# Patient Record
Sex: FEMALE
[De-identification: names, ages, dates, MRNs, and addresses within clinical notes are randomized per-mention and may not be internally consistent; named-entity substitution may affect disease eponyms.]

---

## 2017-11-30 ENCOUNTER — RESULT REVIEW (OUTPATIENT)
Age: 57
End: 2017-11-30

## 2017-12-22 PROBLEM — Z00.00 ENCOUNTER FOR PREVENTIVE HEALTH EXAMINATION: Status: ACTIVE | Noted: 2017-12-22

## 2018-01-03 ENCOUNTER — APPOINTMENT (OUTPATIENT)
Dept: BREAST CENTER | Facility: CLINIC | Age: 58
End: 2018-01-03
Payer: COMMERCIAL

## 2018-01-03 VITALS
HEIGHT: 64 IN | BODY MASS INDEX: 35.34 KG/M2 | DIASTOLIC BLOOD PRESSURE: 82 MMHG | SYSTOLIC BLOOD PRESSURE: 160 MMHG | WEIGHT: 207 LBS | HEART RATE: 90 BPM

## 2018-01-03 DIAGNOSIS — Z78.9 OTHER SPECIFIED HEALTH STATUS: ICD-10-CM

## 2018-01-03 DIAGNOSIS — Z82.49 FAMILY HISTORY OF ISCHEMIC HEART DISEASE AND OTHER DISEASES OF THE CIRCULATORY SYSTEM: ICD-10-CM

## 2018-01-03 DIAGNOSIS — F15.90 OTHER STIMULANT USE, UNSPECIFIED, UNCOMPLICATED: ICD-10-CM

## 2018-01-03 DIAGNOSIS — Z13.71 ENCOUNTER FOR NONPROCREATIVE SCREENING FOR GENETIC DISEASE CARRIER STATUS: ICD-10-CM

## 2018-01-03 DIAGNOSIS — Z80.3 FAMILY HISTORY OF MALIGNANT NEOPLASM OF BREAST: ICD-10-CM

## 2018-01-03 DIAGNOSIS — Z86.19 PERSONAL HISTORY OF OTHER INFECTIOUS AND PARASITIC DISEASES: ICD-10-CM

## 2018-01-03 DIAGNOSIS — Z87.39 PERSONAL HISTORY OF OTHER DISEASES OF THE MUSCULOSKELETAL SYSTEM AND CONNECTIVE TISSUE: ICD-10-CM

## 2018-01-03 DIAGNOSIS — Z85.850 PERSONAL HISTORY OF MALIGNANT NEOPLASM OF THYROID: ICD-10-CM

## 2018-01-03 PROCEDURE — 99242 OFF/OP CONSLTJ NEW/EST SF 20: CPT

## 2018-01-03 PROCEDURE — 36415 COLL VENOUS BLD VENIPUNCTURE: CPT

## 2018-01-08 ENCOUNTER — RX RENEWAL (OUTPATIENT)
Age: 58
End: 2018-01-08

## 2018-04-05 ENCOUNTER — RX RENEWAL (OUTPATIENT)
Age: 58
End: 2018-04-05

## 2018-05-09 ENCOUNTER — APPOINTMENT (OUTPATIENT)
Dept: CARDIOLOGY | Facility: CLINIC | Age: 58
End: 2018-05-09
Payer: COMMERCIAL

## 2018-05-09 ENCOUNTER — NON-APPOINTMENT (OUTPATIENT)
Age: 58
End: 2018-05-09

## 2018-05-09 VITALS — HEART RATE: 80 BPM

## 2018-05-09 VITALS
DIASTOLIC BLOOD PRESSURE: 78 MMHG | BODY MASS INDEX: 33.63 KG/M2 | SYSTOLIC BLOOD PRESSURE: 118 MMHG | HEIGHT: 64 IN | WEIGHT: 197 LBS | RESPIRATION RATE: 16 BRPM | HEART RATE: 80 BPM

## 2018-05-09 PROCEDURE — 99213 OFFICE O/P EST LOW 20 MIN: CPT

## 2018-05-09 PROCEDURE — 93000 ELECTROCARDIOGRAM COMPLETE: CPT

## 2018-05-30 ENCOUNTER — RX RENEWAL (OUTPATIENT)
Age: 58
End: 2018-05-30

## 2018-07-02 ENCOUNTER — RX RENEWAL (OUTPATIENT)
Age: 58
End: 2018-07-02

## 2018-10-16 ENCOUNTER — RX RENEWAL (OUTPATIENT)
Age: 58
End: 2018-10-16

## 2018-11-14 ENCOUNTER — APPOINTMENT (OUTPATIENT)
Dept: CARDIOLOGY | Facility: CLINIC | Age: 58
End: 2018-11-14
Payer: COMMERCIAL

## 2018-11-14 ENCOUNTER — NON-APPOINTMENT (OUTPATIENT)
Age: 58
End: 2018-11-14

## 2018-11-14 VITALS
RESPIRATION RATE: 16 BRPM | BODY MASS INDEX: 33.29 KG/M2 | OXYGEN SATURATION: 97 % | HEIGHT: 64 IN | SYSTOLIC BLOOD PRESSURE: 124 MMHG | DIASTOLIC BLOOD PRESSURE: 78 MMHG | WEIGHT: 195 LBS | HEART RATE: 86 BPM

## 2018-11-14 PROCEDURE — 93000 ELECTROCARDIOGRAM COMPLETE: CPT

## 2018-11-14 PROCEDURE — 99213 OFFICE O/P EST LOW 20 MIN: CPT

## 2018-11-14 NOTE — REASON FOR VISIT
[Follow-Up - Clinic] : a clinic follow-up of [Hyperlipidemia] : hyperlipidemia [Hypertension] : hypertension [Mitral Regurgitation] : mitral regurgitation [FreeTextEntry1] : This is a 58-year-old female with past medical history significant for non-insulin-dependent diabetes mellitus, mitral valve regurgitation, hypothyroidism, occasional palpitations, dyspepsia, who comes in for cardiac evaluation.

## 2018-11-14 NOTE — PHYSICAL EXAM
[General Appearance - Well Developed] : well developed [Normal Appearance] : normal appearance [Well Groomed] : well groomed [General Appearance - Well Nourished] : well nourished [No Deformities] : no deformities [General Appearance - In No Acute Distress] : no acute distress [Normal Conjunctiva] : the conjunctiva exhibited no abnormalities [Normal Oral Mucosa] : normal oral mucosa [Normal Oropharynx] : normal oropharynx [Normal Jugular Venous A Waves Present] : normal jugular venous A waves present [Normal Jugular Venous V Waves Present] : normal jugular venous V waves present [No Jugular Venous Reece A Waves] : no jugular venous reece A waves [Bowel Sounds] : normal bowel sounds [Abdomen Soft] : soft [Abnormal Walk] : normal gait [Gait - Sufficient For Exercise Testing] : the gait was sufficient for exercise testing [Nail Clubbing] : no clubbing of the fingernails [Cyanosis, Localized] : no localized cyanosis [Skin Color & Pigmentation] : normal skin color and pigmentation [Oriented To Time, Place, And Person] : oriented to person, place, and time [Affect] : the affect was normal [Mood] : the mood was normal [No Anxiety] : not feeling anxious [] : no respiratory distress [Respiration, Rhythm And Depth] : normal respiratory rhythm and effort [Exaggerated Use Of Accessory Muscles For Inspiration] : no accessory muscle use [Auscultation Breath Sounds / Voice Sounds] : lungs were clear to auscultation bilaterally [5th Left ICS - MCL] : palpated at the 5th LICS in the midclavicular line [Normal] : normal [No Precordial Heave] : no precordial heave was noted [Normal Rate] : normal [Rhythm Regular] : regular [Normal S1] : normal S1 [Normal S2] : normal S2 [No Gallop] : no gallop heard [I] : a grade 1 [2+] : left 2+ [No Abnormalities] : the abdominal aorta was not enlarged and no bruit was heard [No Pitting Edema] : no pitting edema present [S3] : no S3 [S4] : no S4 [Click] : no click [Pericardial Rub] : no pericardial rub [Right Carotid Bruit] : no bruit heard over the right carotid [Left Carotid Bruit] : no bruit heard over the left carotid [Right Femoral Bruit] : no bruit heard over the right femoral artery [Left Femoral Bruit] : no bruit heard over the left femoral artery

## 2018-11-14 NOTE — DISCUSSION/SUMMARY
[FreeTextEntry1] : This is a 58-year-old female with past medical history significant for non-insulin-dependent diabetes mellitus, mitral valve regurgitation, hypothyroidism, occasional palpitations, dyspepsia, who comes in for cardiac evaluation. She denies chest pain, shortness of breath, dizziness or syncope.\par The patient is doing well from a cardiac standpoint. He tried to maintain adequate weight loss.\par Electrocardiogram done November 14, 2018 demonstrated normal sinus rhythm rate of 86 beats per minute is otherwise unremarkable.\par The patient will followup with her endocrinologist. She just returned from a  [place]. She is on pravastatin 20 mg daily, Synthroid 150 mcg per day, aspirin 81 mg per day, telmisartan 80 mg per day, in addition to her diabetic medication.( glipizide 2.5mg, Invokana 300mg, glucophage 500mg, Tradjenta 5mg)\par She will have  blood work prior to next visit to see me.\par The patient understands that aerobic exercises must be increased to 40 minutes 4 times per week. A detailed discussion of lifestyle modification was done today. The patient has a good understanding of the diagnosis, and treatment plan. Lifestyle modification was also outlined.

## 2018-12-20 ENCOUNTER — APPOINTMENT (OUTPATIENT)
Dept: OBGYN | Facility: CLINIC | Age: 58
End: 2018-12-20
Payer: COMMERCIAL

## 2018-12-20 VITALS
WEIGHT: 200 LBS | DIASTOLIC BLOOD PRESSURE: 78 MMHG | BODY MASS INDEX: 33.73 KG/M2 | SYSTOLIC BLOOD PRESSURE: 120 MMHG | HEIGHT: 64.5 IN

## 2018-12-20 LAB
BILIRUB UR QL STRIP: NORMAL
GLUCOSE BLDC GLUCOMTR-MCNC: 173
GLUCOSE UR-MCNC: NORMAL
HEMOCCULT 2: NEGATIVE
HEMOCCULT SP1 STL QL: NEGATIVE
HGB UR QL STRIP.AUTO: NORMAL
KETONES UR-MCNC: NORMAL
LEUKOCYTE ESTERASE UR QL STRIP: NORMAL
NITRITE UR QL STRIP: NORMAL
PROT UR STRIP-MCNC: NORMAL
QUALITY CONTROL: YES

## 2018-12-20 PROCEDURE — 82962 GLUCOSE BLOOD TEST: CPT

## 2018-12-20 PROCEDURE — 90686 IIV4 VACC NO PRSV 0.5 ML IM: CPT

## 2018-12-20 PROCEDURE — 81003 URINALYSIS AUTO W/O SCOPE: CPT | Mod: QW

## 2018-12-20 PROCEDURE — 90471 IMMUNIZATION ADMIN: CPT

## 2018-12-20 PROCEDURE — 99386 PREV VISIT NEW AGE 40-64: CPT | Mod: 25

## 2018-12-20 PROCEDURE — 82270 OCCULT BLOOD FECES: CPT

## 2018-12-21 LAB — HPV HIGH+LOW RISK DNA PNL CVX: NOT DETECTED

## 2018-12-26 ENCOUNTER — FORM ENCOUNTER (OUTPATIENT)
Age: 58
End: 2018-12-26

## 2018-12-26 LAB — CYTOLOGY CVX/VAG DOC THIN PREP: NORMAL

## 2018-12-27 ENCOUNTER — OUTPATIENT (OUTPATIENT)
Dept: OUTPATIENT SERVICES | Facility: HOSPITAL | Age: 58
LOS: 1 days | End: 2018-12-27
Payer: COMMERCIAL

## 2018-12-27 ENCOUNTER — APPOINTMENT (OUTPATIENT)
Dept: ULTRASOUND IMAGING | Facility: CLINIC | Age: 58
End: 2018-12-27
Payer: COMMERCIAL

## 2018-12-27 DIAGNOSIS — Z00.8 ENCOUNTER FOR OTHER GENERAL EXAMINATION: ICD-10-CM

## 2018-12-27 DIAGNOSIS — Z80.41 FAMILY HISTORY OF MALIGNANT NEOPLASM OF OVARY: ICD-10-CM

## 2018-12-27 PROCEDURE — 76856 US EXAM PELVIC COMPLETE: CPT

## 2018-12-27 PROCEDURE — 76830 TRANSVAGINAL US NON-OB: CPT

## 2018-12-27 PROCEDURE — 76856 US EXAM PELVIC COMPLETE: CPT | Mod: 26

## 2018-12-27 PROCEDURE — 76830 TRANSVAGINAL US NON-OB: CPT | Mod: 26

## 2018-12-29 ENCOUNTER — OTHER (OUTPATIENT)
Age: 58
End: 2018-12-29

## 2018-12-31 ENCOUNTER — RX RENEWAL (OUTPATIENT)
Age: 58
End: 2018-12-31

## 2019-01-02 ENCOUNTER — OTHER (OUTPATIENT)
Age: 59
End: 2019-01-02

## 2019-01-10 ENCOUNTER — TRANSCRIPTION ENCOUNTER (OUTPATIENT)
Age: 59
End: 2019-01-10

## 2019-03-04 ENCOUNTER — TRANSCRIPTION ENCOUNTER (OUTPATIENT)
Age: 59
End: 2019-03-04

## 2019-06-05 ENCOUNTER — APPOINTMENT (OUTPATIENT)
Dept: GASTROENTEROLOGY | Facility: CLINIC | Age: 59
End: 2019-06-05
Payer: COMMERCIAL

## 2019-06-05 VITALS
HEART RATE: 86 BPM | DIASTOLIC BLOOD PRESSURE: 79 MMHG | WEIGHT: 198 LBS | BODY MASS INDEX: 33.39 KG/M2 | HEIGHT: 64.5 IN | SYSTOLIC BLOOD PRESSURE: 139 MMHG

## 2019-06-05 PROCEDURE — 99213 OFFICE O/P EST LOW 20 MIN: CPT

## 2019-06-05 NOTE — ASSESSMENT
[FreeTextEntry1] : 58 yo female with history of dyspepsia and bloating. Plan for abdomen and pelvis sonogram. Trial of PPI antispasmodics. Follow up in three weeks.

## 2019-06-05 NOTE — PHYSICAL EXAM
[General Appearance - Alert] : alert [General Appearance - In No Acute Distress] : in no acute distress [Auscultation Breath Sounds / Voice Sounds] : lungs were clear to auscultation bilaterally [Heart Rate And Rhythm] : heart rate was normal and rhythm regular [Heart Sounds] : normal S1 and S2 [Heart Sounds Gallop] : no gallops [Murmurs] : no murmurs [Heart Sounds Pericardial Friction Rub] : no pericardial rub [Bowel Sounds] : normal bowel sounds [Abdomen Soft] : soft [] : no hepato-splenomegaly [Abdomen Tenderness] : non-tender [Abdomen Mass (___ Cm)] : no abdominal mass palpated

## 2019-06-05 NOTE — HISTORY OF PRESENT ILLNESS
[de-identified] : 60 yo female with long history of irritable bowel syndrome. Patient has noted some increased bloating and dyspepsia. There's been no change in her medical history or change in medications. Patient has been taking daily PPIs.

## 2019-06-07 ENCOUNTER — APPOINTMENT (OUTPATIENT)
Dept: CARDIOLOGY | Facility: CLINIC | Age: 59
End: 2019-06-07
Payer: COMMERCIAL

## 2019-06-07 VITALS — HEART RATE: 96 BPM | DIASTOLIC BLOOD PRESSURE: 68 MMHG | SYSTOLIC BLOOD PRESSURE: 114 MMHG | RESPIRATION RATE: 16 BRPM

## 2019-06-07 VITALS
RESPIRATION RATE: 15 BRPM | DIASTOLIC BLOOD PRESSURE: 79 MMHG | HEIGHT: 64 IN | HEART RATE: 82 BPM | WEIGHT: 198 LBS | SYSTOLIC BLOOD PRESSURE: 126 MMHG | BODY MASS INDEX: 33.8 KG/M2

## 2019-06-07 PROCEDURE — ZZZZZ: CPT

## 2019-06-07 PROCEDURE — 93306 TTE W/DOPPLER COMPLETE: CPT

## 2019-06-07 PROCEDURE — 99213 OFFICE O/P EST LOW 20 MIN: CPT | Mod: 25

## 2019-06-07 PROCEDURE — 93015 CV STRESS TEST SUPVJ I&R: CPT

## 2019-06-14 RX ORDER — EMPAGLIFLOZIN 25 MG/1
25 TABLET, FILM COATED ORAL DAILY
Refills: 0 | Status: ACTIVE | COMMUNITY
Start: 2019-06-14

## 2019-06-14 RX ORDER — CANAGLIFLOZIN 300 MG/1
300 TABLET, FILM COATED ORAL
Refills: 0 | Status: DISCONTINUED | COMMUNITY
End: 2019-06-14

## 2019-06-27 ENCOUNTER — APPOINTMENT (OUTPATIENT)
Dept: GASTROENTEROLOGY | Facility: CLINIC | Age: 59
End: 2019-06-27
Payer: COMMERCIAL

## 2019-06-27 VITALS
DIASTOLIC BLOOD PRESSURE: 75 MMHG | SYSTOLIC BLOOD PRESSURE: 127 MMHG | WEIGHT: 200 LBS | HEIGHT: 64 IN | BODY MASS INDEX: 34.15 KG/M2 | HEART RATE: 85 BPM

## 2019-06-27 PROCEDURE — 99213 OFFICE O/P EST LOW 20 MIN: CPT

## 2019-06-27 NOTE — HISTORY OF PRESENT ILLNESS
[de-identified] : 60 yo female with history of GERD symptoms and IBS. Patient has had good response to antispasmodic medications. Abdominal sonogram was essentially normal. Patient has noted breakthrough GERD symptoms with pantoprazole.

## 2019-06-27 NOTE — ASSESSMENT
[FreeTextEntry1] : 58 yo female with GERD and IBS. Continue antispasmotic. Follow up in three to four months.

## 2019-09-12 ENCOUNTER — APPOINTMENT (OUTPATIENT)
Dept: SPINE | Facility: CLINIC | Age: 59
End: 2019-09-12

## 2019-10-02 ENCOUNTER — RX RENEWAL (OUTPATIENT)
Age: 59
End: 2019-10-02

## 2019-10-03 ENCOUNTER — APPOINTMENT (OUTPATIENT)
Dept: GASTROENTEROLOGY | Facility: CLINIC | Age: 59
End: 2019-10-03
Payer: COMMERCIAL

## 2019-10-03 VITALS
HEIGHT: 64 IN | SYSTOLIC BLOOD PRESSURE: 139 MMHG | DIASTOLIC BLOOD PRESSURE: 80 MMHG | BODY MASS INDEX: 33.8 KG/M2 | HEART RATE: 87 BPM | WEIGHT: 198 LBS

## 2019-10-03 DIAGNOSIS — K21.9 GASTRO-ESOPHAGEAL REFLUX DISEASE W/OUT ESOPHAGITIS: ICD-10-CM

## 2019-10-03 DIAGNOSIS — K58.9 IRRITABLE BOWEL SYNDROME W/OUT DIARRHEA: ICD-10-CM

## 2019-10-03 PROCEDURE — 99213 OFFICE O/P EST LOW 20 MIN: CPT

## 2019-10-03 NOTE — ASSESSMENT
[FreeTextEntry1] : 58 yo female with well controlled GERD and IBS. Continue current rx. Follow up in six months and consider taper at that time.

## 2019-10-03 NOTE — HISTORY OF PRESENT ILLNESS
[de-identified] : Ms. PORSCHE VILLALOBOS is a 59 year old female with history of GERD symptoms and IBS. Since changing antispasmodics, patient has had good symptomatic response. GERD is under good control with daily PPIs. There is no dysphagia. Patient did have an episode of sciatica but otherwise feels well.\par

## 2019-10-03 NOTE — PHYSICAL EXAM
[General Appearance - Alert] : alert [General Appearance - In No Acute Distress] : in no acute distress [Auscultation Breath Sounds / Voice Sounds] : lungs were clear to auscultation bilaterally [Heart Sounds] : normal S1 and S2 [Heart Rate And Rhythm] : heart rate was normal and rhythm regular [Heart Sounds Gallop] : no gallops [Murmurs] : no murmurs [Bowel Sounds] : normal bowel sounds [Heart Sounds Pericardial Friction Rub] : no pericardial rub [Abdomen Tenderness] : non-tender [] : no hepato-splenomegaly [Abdomen Soft] : soft [Abdomen Mass (___ Cm)] : no abdominal mass palpated

## 2019-10-11 ENCOUNTER — RX RENEWAL (OUTPATIENT)
Age: 59
End: 2019-10-11

## 2019-11-07 ENCOUNTER — APPOINTMENT (OUTPATIENT)
Dept: OBGYN | Facility: CLINIC | Age: 59
End: 2019-11-07
Payer: COMMERCIAL

## 2019-11-07 ENCOUNTER — RESULT CHARGE (OUTPATIENT)
Age: 59
End: 2019-11-07

## 2019-11-07 VITALS
WEIGHT: 205 LBS | SYSTOLIC BLOOD PRESSURE: 116 MMHG | DIASTOLIC BLOOD PRESSURE: 72 MMHG | HEIGHT: 64 IN | BODY MASS INDEX: 35 KG/M2

## 2019-11-07 DIAGNOSIS — N93.9 ABNORMAL UTERINE AND VAGINAL BLEEDING, UNSPECIFIED: ICD-10-CM

## 2019-11-07 DIAGNOSIS — B37.2 CANDIDIASIS OF SKIN AND NAIL: ICD-10-CM

## 2019-11-07 LAB
BILIRUB UR QL STRIP: NORMAL
GLUCOSE BLDC GLUCOMTR-MCNC: 150
GLUCOSE UR-MCNC: 500
HCG UR QL: 0.2 EU/DL
HGB UR QL STRIP.AUTO: NORMAL
KETONES UR-MCNC: NORMAL
LEUKOCYTE ESTERASE UR QL STRIP: NORMAL
NITRITE UR QL STRIP: NORMAL
PH UR STRIP: 5
PROT UR STRIP-MCNC: NORMAL
SP GR UR STRIP: 1.01

## 2019-11-07 PROCEDURE — 82962 GLUCOSE BLOOD TEST: CPT | Mod: QW

## 2019-11-07 PROCEDURE — 36415 COLL VENOUS BLD VENIPUNCTURE: CPT

## 2019-11-07 PROCEDURE — 81003 URINALYSIS AUTO W/O SCOPE: CPT | Mod: QW

## 2019-11-07 PROCEDURE — 99214 OFFICE O/P EST MOD 30 MIN: CPT

## 2019-11-07 NOTE — PHYSICAL EXAM
[Vulvitis] : vulvitis [Labia Majora Erythema] : erythema of the labia majora [Erythema] : erythema [Labia Minora Erythema] : erythema of the labia minora [No Bleeding] : there was no active vaginal bleeding [Discharge] : no discharge

## 2019-11-07 NOTE — HISTORY OF PRESENT ILLNESS
[Last Mammogram ___] : Last Mammogram was [unfilled] [1 Year Ago] : 1 year ago [Last Pap ___] : Last cervical pap smear was [unfilled] [Postmenopausal] : is postmenopausal [unknown] : the patient is unsure of the date of her LMP [Menarche Age: ____] : age at menarche was [unfilled] [Sexually Active] : is sexually active [Monogamous] : is monogamous [Male ___] : [unfilled] male [NA] : N/A [de-identified] : PELVIC US 12/27/2018 [Contraception] : does not use contraception

## 2019-11-07 NOTE — COUNSELING
[Exercise] : exercise [Nutrition] : nutrition [Other ___] : [unfilled] [Weight Management] : weight management

## 2019-11-14 ENCOUNTER — ASOB RESULT (OUTPATIENT)
Age: 59
End: 2019-11-14

## 2019-11-14 ENCOUNTER — APPOINTMENT (OUTPATIENT)
Dept: OBGYN | Facility: CLINIC | Age: 59
End: 2019-11-14
Payer: COMMERCIAL

## 2019-11-14 VITALS
DIASTOLIC BLOOD PRESSURE: 70 MMHG | HEIGHT: 64 IN | WEIGHT: 205 LBS | SYSTOLIC BLOOD PRESSURE: 130 MMHG | BODY MASS INDEX: 35 KG/M2

## 2019-11-14 PROCEDURE — 76830 TRANSVAGINAL US NON-OB: CPT

## 2019-11-14 PROCEDURE — XXXXX: CPT

## 2019-11-14 NOTE — PHYSICAL EXAM
[Alert] : alert [Awake] : awake [Acute Distress] : no acute distress [Oriented x3] : oriented to person, place, and time [Depressed Mood] : not depressed [Flat Affect] : affect not flat [No Lesions] : no genitalia lesions [Labia Majora Erythema] : no erythema of the labia majora [Labia Minora Erythema] : no erythema of the labia minora [Normal] : clitoris [Labia Majora] : labia major [Labia Minora] : labia minora [Atrophy] : atrophy [Dry Mucosa] : dry mucosa [No Bleeding] : there was no active vaginal bleeding [Tenderness] : nontender [Enlarged ___ wks] : not enlarged [Mass ___ cm] : no uterine mass was palpated

## 2019-11-14 NOTE — DISCUSSION/SUMMARY
[FreeTextEntry1] : I, Rafal Melchor, acted solely as a scribe for Dr. Walters on this date 11/14/2019.\par All medical record entries made by the Scribe were at my, Dr. Walters's direction and personally dictated by me on  11/14/2019. I have reviewed the chart and agree that the record accurately reflects my personal performance of the history, physical exam, assessment and plan. I have also personally directed, reviewed, and agreed with the chart.\par \par

## 2019-12-06 ENCOUNTER — APPOINTMENT (OUTPATIENT)
Dept: OBGYN | Facility: CLINIC | Age: 59
End: 2019-12-06
Payer: COMMERCIAL

## 2019-12-06 VITALS
SYSTOLIC BLOOD PRESSURE: 120 MMHG | HEIGHT: 64 IN | DIASTOLIC BLOOD PRESSURE: 80 MMHG | WEIGHT: 195 LBS | BODY MASS INDEX: 33.29 KG/M2

## 2019-12-06 PROCEDURE — XXXXX: CPT

## 2019-12-20 ENCOUNTER — APPOINTMENT (OUTPATIENT)
Dept: OBGYN | Facility: CLINIC | Age: 59
End: 2019-12-20

## 2020-01-15 ENCOUNTER — APPOINTMENT (OUTPATIENT)
Dept: CARDIOLOGY | Facility: CLINIC | Age: 60
End: 2020-01-15
Payer: COMMERCIAL

## 2020-01-15 ENCOUNTER — NON-APPOINTMENT (OUTPATIENT)
Age: 60
End: 2020-01-15

## 2020-01-15 VITALS
HEART RATE: 99 BPM | DIASTOLIC BLOOD PRESSURE: 74 MMHG | SYSTOLIC BLOOD PRESSURE: 122 MMHG | WEIGHT: 202 LBS | BODY MASS INDEX: 34.49 KG/M2 | RESPIRATION RATE: 16 BRPM | HEIGHT: 64 IN

## 2020-01-15 PROCEDURE — 93000 ELECTROCARDIOGRAM COMPLETE: CPT

## 2020-01-15 PROCEDURE — 99213 OFFICE O/P EST LOW 20 MIN: CPT

## 2020-01-16 ENCOUNTER — APPOINTMENT (OUTPATIENT)
Dept: OBGYN | Facility: CLINIC | Age: 60
End: 2020-01-16
Payer: COMMERCIAL

## 2020-01-16 VITALS
WEIGHT: 200 LBS | DIASTOLIC BLOOD PRESSURE: 74 MMHG | BODY MASS INDEX: 34.15 KG/M2 | HEIGHT: 64 IN | SYSTOLIC BLOOD PRESSURE: 120 MMHG | HEART RATE: 86 BPM

## 2020-01-16 DIAGNOSIS — R92.2 INCONCLUSIVE MAMMOGRAM: ICD-10-CM

## 2020-01-16 DIAGNOSIS — N76.2 ACUTE VULVITIS: ICD-10-CM

## 2020-01-16 LAB
BILIRUB UR QL STRIP: NORMAL
DATE COLLECTED: NORMAL
GLUCOSE BLDC GLUCOMTR-MCNC: 156
GLUCOSE UR-MCNC: ABNORMAL
HCG UR QL: 0.2 EU/DL
HEMOCCULT SP1 STL QL: NEGATIVE
HGB UR QL STRIP.AUTO: NORMAL
KETONES UR-MCNC: NORMAL
LEUKOCYTE ESTERASE UR QL STRIP: NORMAL
NITRITE UR QL STRIP: NORMAL
PH UR STRIP: 5.5
PROT UR STRIP-MCNC: NORMAL
QUALITY CONTROL: YES
SP GR UR STRIP: 1

## 2020-01-16 PROCEDURE — 99214 OFFICE O/P EST MOD 30 MIN: CPT | Mod: 25

## 2020-01-16 PROCEDURE — 82962 GLUCOSE BLOOD TEST: CPT

## 2020-01-16 PROCEDURE — 81003 URINALYSIS AUTO W/O SCOPE: CPT | Mod: QW

## 2020-01-16 PROCEDURE — 99396 PREV VISIT EST AGE 40-64: CPT

## 2020-01-16 PROCEDURE — 82270 OCCULT BLOOD FECES: CPT

## 2020-01-17 PROBLEM — R92.2 DENSE BREASTS: Status: ACTIVE | Noted: 2020-01-16

## 2020-01-17 PROBLEM — N76.2 VULVITIS: Status: ACTIVE | Noted: 2018-12-20

## 2020-01-17 LAB — HPV HIGH+LOW RISK DNA PNL CVX: NOT DETECTED

## 2020-01-17 RX ORDER — METFORMIN HYDROCHLORIDE 1000 MG/1
1000 TABLET, COATED ORAL DAILY
Refills: 0 | Status: ACTIVE | COMMUNITY

## 2020-01-17 NOTE — END OF VISIT
[FreeTextEntry3] : I, Sandra Sullivan, acted solely as a scribe for Dr. Stone on this date 01/16/2020. \par All medical record entries made by the Scribe were at my, Dr. Stone's  direction and personally dictated by me on 01/16/2020. I have reviewed the chart and agree that the record accurately reflects my personal performance of the history, physical exam, assessment and plan. I have also personally directed, reviewed, and agreed with the chart.

## 2020-01-17 NOTE — HISTORY OF PRESENT ILLNESS
[Last Pap ___] : Last cervical pap smear was [unfilled] [Last Mammogram ___] : Last Mammogram was [unfilled] [Postmenopausal] : is postmenopausal [Menarche Age: ____] : age at menarche was [unfilled] [unknown] : the patient is unsure of the date of her LMP [Currently In Menopause] : currently in menopause [Monogamous] : is monogamous [Sexually Active] : is sexually active [NA] : N/A [Male ___] : [unfilled] male [de-identified] : Pelvic us 11/14/2019, Breast MRI 11/18/2019 per pt [Contraception] : does not use contraception

## 2020-01-17 NOTE — PHYSICAL EXAM
[Awake] : awake [Alert] : alert [Examination Of The Breasts] : a normal appearance [No Masses] : no breast masses were palpable [Soft] : soft [Oriented x3] : oriented to person, place, and time [Labia Majora] : labia major [Labia Minora] : labia minora [Normal] : clitoris [Atrophy] : atrophy [No Bleeding] : there was no active vaginal bleeding [Pap Obtained] : a Pap smear was performed [Adnexa Absent] : was absent on the right [Uterine Adnexae] : was normal on the left [No Tenderness] : no rectal tenderness [Nl Sphincter Tone] : normal sphincter tone [Mass] : no breast mass [Acute Distress] : no acute distress [Nipple Discharge] : no nipple discharge [Axillary LAD] : no axillary lymphadenopathy [Tender] : non tender [Depressed Mood] : not depressed [Flat Affect] : affect not flat [Occult Blood] : occult blood test from digital rectal exam was negative [FreeTextEntry5] : Cervical stenotic

## 2020-01-22 ENCOUNTER — APPOINTMENT (OUTPATIENT)
Dept: CARDIOLOGY | Facility: CLINIC | Age: 60
End: 2020-01-22
Payer: COMMERCIAL

## 2020-01-22 LAB — CYTOLOGY CVX/VAG DOC THIN PREP: ABNORMAL

## 2020-01-22 PROCEDURE — 93224 XTRNL ECG REC UP TO 48 HRS: CPT

## 2020-01-23 ENCOUNTER — NON-APPOINTMENT (OUTPATIENT)
Age: 60
End: 2020-01-23

## 2020-01-27 ENCOUNTER — OUTPATIENT (OUTPATIENT)
Dept: OUTPATIENT SERVICES | Facility: HOSPITAL | Age: 60
LOS: 1 days | End: 2020-01-27
Payer: COMMERCIAL

## 2020-01-27 VITALS
RESPIRATION RATE: 20 BRPM | TEMPERATURE: 99 F | SYSTOLIC BLOOD PRESSURE: 136 MMHG | HEART RATE: 108 BPM | DIASTOLIC BLOOD PRESSURE: 78 MMHG | HEIGHT: 64 IN | WEIGHT: 198.2 LBS

## 2020-01-27 DIAGNOSIS — Z29.9 ENCOUNTER FOR PROPHYLACTIC MEASURES, UNSPECIFIED: ICD-10-CM

## 2020-01-27 DIAGNOSIS — Z01.818 ENCOUNTER FOR OTHER PREPROCEDURAL EXAMINATION: ICD-10-CM

## 2020-01-27 DIAGNOSIS — E11.9 TYPE 2 DIABETES MELLITUS WITHOUT COMPLICATIONS: ICD-10-CM

## 2020-01-27 DIAGNOSIS — I10 ESSENTIAL (PRIMARY) HYPERTENSION: ICD-10-CM

## 2020-01-27 DIAGNOSIS — N95.0 POSTMENOPAUSAL BLEEDING: ICD-10-CM

## 2020-01-27 DIAGNOSIS — E89.0 POSTPROCEDURAL HYPOTHYROIDISM: Chronic | ICD-10-CM

## 2020-01-27 DIAGNOSIS — Z90.721 ACQUIRED ABSENCE OF OVARIES, UNILATERAL: Chronic | ICD-10-CM

## 2020-01-27 PROCEDURE — G0463: CPT

## 2020-01-27 NOTE — H&P PST ADULT - NSICDXPASTMEDICALHX_GEN_ALL_CORE_FT
PAST MEDICAL HISTORY:  Diabetes mellitus     Hypertension     IBS (irritable bowel syndrome)     Thyroid cancer

## 2020-01-27 NOTE — H&P PST ADULT - NSICDXPROBLEM_GEN_ALL_CORE_FT
PROBLEM DIAGNOSES  Problem: Hypertension  Assessment and Plan: Preop medical eval.    Problem: Diabetes mellitus  Assessment and Plan: POC glucose DOS    Problem: Need for prophylactic measure  Assessment and Plan: Caprini score  4, mod risk for VTE, SCDs ordered, surgical team to assess need for pharm proph      Problem: Postmenopause bleeding  Assessment and Plan: Dilation and Curettage, hysteroscopy, possible polypecotmy

## 2020-01-27 NOTE — H&P PST ADULT - ASSESSMENT
58 yo female with post menopausal bleeding.    CAPRINI VTE 2.0 SCORE [CLOT updated 2019]    AGE RELATED RISK FACTORS                                                       MOBILITY RELATED FACTORS  [ ] Age 41-60 years                                            (1 Point)                    [ ] Bed rest                                                        (1 Point)  [ x ] Age: 61-74 years                                           (2 Points)                  [ ] Plaster cast                                                   (2 Points)  [ ] Age= 75 years                                              (3 Points)                    [ ] Bed bound for more than 72 hours                 (2 Points)    DISEASE RELATED RISK FACTORS                                               GENDER SPECIFIC FACTORS  [ ] Edema in the lower extremities                       (1 Point)              [ ] Pregnancy                                                     (1 Point)  [ ] Varicose veins                                               (1 Point)                     [ ] Post-partum < 6 weeks                                   (1 Point)             [ x ] BMI > 25 Kg/m2                                            (1 Point)                     [ ] Hormonal therapy  or oral contraception          (1 Point)                 [ ] Sepsis (in the previous month)                        (1 Point)               [ ] History of pregnancy complications                 (1 point)  [ ] Pneumonia or serious lung disease                                               [ ] Unexplained or recurrent                     (1 Point)           (in the previous month)                               (1 Point)  [ ] Abnormal pulmonary function test                     (1 Point)                 SURGERY RELATED RISK FACTORS  [ ] Acute myocardial infarction                              (1 Point)               [ ]  Section                                             (1 Point)  [ ] Congestive heart failure (in the previous month)  (1 Point)      [x ] Minor surgery                                                  (1 Point)   [ ] Inflammatory bowel disease                             (1 Point)               [ ] Arthroscopic surgery                                        (2 Points)  [ ] Central venous access                                      (2 Points)                [ ] General surgery lasting more than 45 minutes (2 points)  [ ] Malignancy- Present or previous                   (2 Points)                [ ] Elective arthroplasty                                         (5 points)    [ ] Stroke (in the previous month)                          (5 Points)                                                                                                                                                           HEMATOLOGY RELATED FACTORS                                                 TRAUMA RELATED RISK FACTORS  [ ] Prior episodes of VTE                                     (3 Points)                [ ] Fracture of the hip, pelvis, or leg                       (5 Points)  [ ] Positive family history for VTE                         (3 Points)             [ ] Acute spinal cord injury (in the previous month)  (5 Points)  [ ] Prothrombin 80123 A                                     (3 Points)               [ ] Paralysis  (less than 1 month)                             (5 Points)  [ ] Factor V Leiden                                             (3 Points)                  [ ] Multiple Trauma within 1 month                        (5 Points)  [ ] Lupus anticoagulants                                     (3 Points)                                                           [ ] Anticardiolipin antibodies                               (3 Points)                                                       [ ] High homocysteine in the blood                      (3 Points)                                             [ ] Other congenital or acquired thrombophilia      (3 Points)                                                [ ] Heparin induced thrombocytopenia                  (3 Points)                                     Total Score [      4    ]    OPIOID RISK TOOL    RODY EACH BOX THAT APPLIES AND ADD TOTALS AT THE END    FAMILY HISTORY OF SUBSTANCE ABUSE                 FEMALE         MALE                                                Alcohol                             [ x ]1 pt          [  ]3pts                                               Illegal Durgs                     [  x]2 pts        [  ]3pts                                               Rx Drugs                           [  ]4 pts        [  ]4 pts    PERSONAL HISTORY OF SUBSTANCE ABUSE                                                                                          Alcohol                             [  ]3 pts       [  ]3 pts                                               Illegal Drugs                     [  ]4 pts        [  ]4 pts                                               Rx Drugs                           [  ]5 pts        [  ]5 pts    AGE BETWEEN 16-45 YEARS                                      [  ]1 pt         [  ]1 pt    HISTORY OF PREADOLESCENT   SEXUAL ABUSE                                                             [ x ]3 pts        [  ]0pts    PSYCHOLOGICAL DISEASE                     ADD, OCD, Bipolar, Schizophrenia        [  ]2 pts         [  ]2 pts                      Depression                                               [  ]1 pt           [  ]1 pt           SCORING TOTAL   (add numbers and type here)              (*6*)                                     A score of 3 or lower indicated LOW risk for future opioid abuse  A score of 4 to 7 indicated moderate risk for future opioid abuse  A score of 8 or higher indicates a high risk for opioid abuse

## 2020-01-27 NOTE — H&P PST ADULT - NSICDXFAMILYHX_GEN_ALL_CORE_FT
FAMILY HISTORY:  Father  Still living? No  Family history of stroke, Age at diagnosis: 81-90    Mother  Still living? No  FH: ovarian cancer, Age at diagnosis: 61-70

## 2020-01-27 NOTE — H&P PST ADULT - NSANTHOSAYNRD_GEN_A_CORE
No. MARTA screening performed.  STOP BANG Legend: 0-2 = LOW Risk; 3-4 = INTERMEDIATE Risk; 5-8 = HIGH Risk

## 2020-01-30 ENCOUNTER — RX RENEWAL (OUTPATIENT)
Age: 60
End: 2020-01-30

## 2020-01-30 ENCOUNTER — NON-APPOINTMENT (OUTPATIENT)
Age: 60
End: 2020-01-30

## 2020-02-03 ENCOUNTER — RX RENEWAL (OUTPATIENT)
Age: 60
End: 2020-02-03

## 2020-02-09 ENCOUNTER — TRANSCRIPTION ENCOUNTER (OUTPATIENT)
Age: 60
End: 2020-02-09

## 2020-02-10 ENCOUNTER — APPOINTMENT (OUTPATIENT)
Dept: OBGYN | Facility: HOSPITAL | Age: 60
End: 2020-02-10
Payer: COMMERCIAL

## 2020-02-10 ENCOUNTER — RESULT REVIEW (OUTPATIENT)
Age: 60
End: 2020-02-10

## 2020-02-10 ENCOUNTER — OUTPATIENT (OUTPATIENT)
Dept: INPATIENT UNIT | Facility: HOSPITAL | Age: 60
LOS: 1 days | End: 2020-02-10
Payer: COMMERCIAL

## 2020-02-10 VITALS
HEART RATE: 78 BPM | DIASTOLIC BLOOD PRESSURE: 59 MMHG | OXYGEN SATURATION: 99 % | SYSTOLIC BLOOD PRESSURE: 116 MMHG | RESPIRATION RATE: 16 BRPM | TEMPERATURE: 98 F

## 2020-02-10 VITALS
RESPIRATION RATE: 16 BRPM | DIASTOLIC BLOOD PRESSURE: 45 MMHG | SYSTOLIC BLOOD PRESSURE: 101 MMHG | HEART RATE: 95 BPM | HEIGHT: 64 IN | WEIGHT: 198.2 LBS | OXYGEN SATURATION: 98 % | TEMPERATURE: 98 F

## 2020-02-10 DIAGNOSIS — E89.0 POSTPROCEDURAL HYPOTHYROIDISM: Chronic | ICD-10-CM

## 2020-02-10 DIAGNOSIS — Z90.721 ACQUIRED ABSENCE OF OVARIES, UNILATERAL: Chronic | ICD-10-CM

## 2020-02-10 DIAGNOSIS — N95.0 POSTMENOPAUSAL BLEEDING: ICD-10-CM

## 2020-02-10 LAB
GLUCOSE BLDC GLUCOMTR-MCNC: 115 MG/DL — HIGH (ref 70–99)
GLUCOSE BLDC GLUCOMTR-MCNC: 160 MG/DL — HIGH (ref 70–99)

## 2020-02-10 PROCEDURE — XXXXX: CPT

## 2020-02-10 PROCEDURE — 82962 GLUCOSE BLOOD TEST: CPT

## 2020-02-10 PROCEDURE — 88305 TISSUE EXAM BY PATHOLOGIST: CPT

## 2020-02-10 PROCEDURE — 88305 TISSUE EXAM BY PATHOLOGIST: CPT | Mod: 26

## 2020-02-10 PROCEDURE — 58558 HYSTEROSCOPY BIOPSY: CPT

## 2020-02-10 RX ORDER — ONDANSETRON 8 MG/1
4 TABLET, FILM COATED ORAL ONCE
Refills: 0 | Status: DISCONTINUED | OUTPATIENT
Start: 2020-02-10 | End: 2020-02-10

## 2020-02-10 RX ORDER — PANTOPRAZOLE SODIUM 20 MG/1
1 TABLET, DELAYED RELEASE ORAL
Qty: 0 | Refills: 0 | DISCHARGE

## 2020-02-10 RX ORDER — LEVOTHYROXINE SODIUM 125 MCG
1 TABLET ORAL
Qty: 0 | Refills: 0 | DISCHARGE

## 2020-02-10 RX ORDER — SODIUM CHLORIDE 9 MG/ML
1000 INJECTION, SOLUTION INTRAVENOUS
Refills: 0 | Status: DISCONTINUED | OUTPATIENT
Start: 2020-02-10 | End: 2020-03-04

## 2020-02-10 RX ORDER — TELMISARTAN 20 MG/1
1 TABLET ORAL
Qty: 0 | Refills: 0 | DISCHARGE

## 2020-02-10 RX ORDER — LINAGLIPTIN 5 MG/1
1 TABLET, FILM COATED ORAL
Qty: 0 | Refills: 0 | DISCHARGE

## 2020-02-10 RX ORDER — ASPIRIN/CALCIUM CARB/MAGNESIUM 324 MG
1 TABLET ORAL
Qty: 0 | Refills: 0 | DISCHARGE

## 2020-02-10 RX ORDER — HYDROMORPHONE HYDROCHLORIDE 2 MG/ML
0.5 INJECTION INTRAMUSCULAR; INTRAVENOUS; SUBCUTANEOUS
Refills: 0 | Status: DISCONTINUED | OUTPATIENT
Start: 2020-02-10 | End: 2020-02-10

## 2020-02-10 RX ORDER — EMPAGLIFLOZIN 10 MG/1
1 TABLET, FILM COATED ORAL
Qty: 0 | Refills: 0 | DISCHARGE

## 2020-02-10 RX ORDER — HYOSCYAMINE SULFATE 0.13 MG
1 TABLET ORAL
Qty: 0 | Refills: 0 | DISCHARGE

## 2020-02-10 RX ORDER — SODIUM CHLORIDE 9 MG/ML
1000 INJECTION, SOLUTION INTRAVENOUS
Refills: 0 | Status: DISCONTINUED | OUTPATIENT
Start: 2020-02-10 | End: 2020-02-10

## 2020-02-10 RX ORDER — METFORMIN HYDROCHLORIDE 850 MG/1
2 TABLET ORAL
Qty: 0 | Refills: 0 | DISCHARGE

## 2020-02-10 RX ADMIN — HYDROMORPHONE HYDROCHLORIDE 0.5 MILLIGRAM(S): 2 INJECTION INTRAMUSCULAR; INTRAVENOUS; SUBCUTANEOUS at 15:49

## 2020-02-10 NOTE — BRIEF OPERATIVE NOTE - OPERATION/FINDINGS
pinpoint cervical os, dilated after a cruciate incision, symmetrical cavity with a single dominant polypoid mass, normal ostiae and otherwise unremarkable cavities (endocervical and endometrial).

## 2020-02-10 NOTE — BRIEF OPERATIVE NOTE - NSICDXBRIEFPREOP_GEN_ALL_CORE_FT
PRE-OP DIAGNOSIS:  Postmenopausal state 10-Feb-2020 15:31:35  Pako Jung  Endometrial mass 10-Feb-2020 15:31:17  Pako Jung  Cervical os stenosis 10-Feb-2020 15:31:08  Pako Jung

## 2020-02-10 NOTE — BRIEF OPERATIVE NOTE - NSICDXBRIEFPOSTOP_GEN_ALL_CORE_FT
POST-OP DIAGNOSIS:  Cervical os stenosis 10-Feb-2020 15:32:45  Pako Jung  Endometrial polyp 10-Feb-2020 15:32:12  Pako Jung

## 2020-02-10 NOTE — ASU DISCHARGE PLAN (ADULT/PEDIATRIC) - CARE PROVIDER_API CALL
Pako Jung (MD)  Obstetrics and Gynecology  [address] [place] [address]  Phone: [phone]  Fax: [phone]  Follow Up Time:

## 2020-02-10 NOTE — BRIEF OPERATIVE NOTE - NSICDXBRIEFPROCEDURE_GEN_ALL_CORE_FT
PROCEDURES:  Hysteroscopy, with dilation and curettage of uterus and polypectomy or uterine myomectomy using MyoGreenstackre tissue removal system 10-Feb-2020 15:31:56  Pako Jung

## 2020-02-11 PROBLEM — C73 MALIGNANT NEOPLASM OF THYROID GLAND: Chronic | Status: ACTIVE | Noted: 2020-01-27

## 2020-02-11 PROBLEM — E11.9 TYPE 2 DIABETES MELLITUS WITHOUT COMPLICATIONS: Chronic | Status: ACTIVE | Noted: 2020-01-27

## 2020-02-11 PROBLEM — I10 ESSENTIAL (PRIMARY) HYPERTENSION: Chronic | Status: ACTIVE | Noted: 2020-01-27

## 2020-02-11 PROBLEM — K58.9 IRRITABLE BOWEL SYNDROME WITHOUT DIARRHEA: Chronic | Status: ACTIVE | Noted: 2020-01-27

## 2020-02-14 LAB — SURGICAL PATHOLOGY STUDY: SIGNIFICANT CHANGE UP

## 2020-02-19 ENCOUNTER — APPOINTMENT (OUTPATIENT)
Dept: OBGYN | Facility: CLINIC | Age: 60
End: 2020-02-19
Payer: COMMERCIAL

## 2020-02-19 VITALS
HEIGHT: 64 IN | DIASTOLIC BLOOD PRESSURE: 70 MMHG | BODY MASS INDEX: 34.15 KG/M2 | SYSTOLIC BLOOD PRESSURE: 124 MMHG | WEIGHT: 200 LBS

## 2020-02-19 DIAGNOSIS — Z86.39 PERSONAL HISTORY OF OTHER ENDOCRINE, NUTRITIONAL AND METABOLIC DISEASE: ICD-10-CM

## 2020-02-19 DIAGNOSIS — R14.0 ABDOMINAL DISTENSION (GASEOUS): ICD-10-CM

## 2020-02-19 PROCEDURE — 99213 OFFICE O/P EST LOW 20 MIN: CPT

## 2020-02-19 RX ORDER — MISOPROSTOL 200 UG/1
200 TABLET ORAL
Qty: 2 | Refills: 0 | Status: DISCONTINUED | COMMUNITY
Start: 2019-11-14 | End: 2020-02-19

## 2020-02-19 RX ORDER — CLOTRIMAZOLE AND BETAMETHASONE DIPROPIONATE 10; .5 MG/G; MG/G
1-0.05 CREAM TOPICAL TWICE DAILY
Qty: 1 | Refills: 1 | Status: DISCONTINUED | COMMUNITY
Start: 2019-11-07 | End: 2020-02-19

## 2020-02-20 NOTE — END OF VISIT
[FreeTextEntry3] : I, Rafal Melchor, acted solely as a scribe for Dr. Stone on this date 02/19/2020.\par All medical record entries made by the Scribe were at my, Dr. Stone's direction and personally dictated by me on  02/19/2020. I have reviewed the chart and agree that the record accurately reflects my personal performance of the history, physical exam, assessment and plan. I have also personally directed, reviewed, and agreed with the chart.\par \par

## 2020-02-20 NOTE — HISTORY OF PRESENT ILLNESS
[___ Month(s) Ago] : [unfilled] month(s) ago [Healthy Diet] : a healthy diet [Good] : being in good health [Regular Exercise] : regular exercise [Last Mammogram ___] : Last Mammogram was [unfilled] [Last Pap ___] : Last cervical pap smear was [unfilled] [Pregnancy History] : pregnancy history: [Total Preg ___] : [unfilled] [Living ___] : [unfilled] [Menarche Age: ____] : age at menarche was [unfilled] [AB Spont ___] : miscarriages: [unfilled] [Definite:  ___ (Date)] : the last menstrual period was [unfilled] [Sexually Active] : is sexually active [Monogamous] : is monogamous [Male ___] : [unfilled] male [Weight Concerns] : no concerns with her weight [de-identified] : breast mri 11/2019  abdomen u/s 06/09/2019

## 2020-03-26 NOTE — END OF VISIT
[FreeTextEntry3] : I, Sandra Sullivan, acted solely as a scribe for Dr. Walters on this date 12/06/2019. \par All medical record entries made by the Scribe were at my, Dr. Walters's  direction and personally dictated by me on 12/06/2019. I have reviewed the chart and agree that the record accurately reflects my personal performance of the history, physical exam, assessment and plan. I have also personally directed, reviewed, and agreed with the chart.

## 2020-03-26 NOTE — PROCEDURE
[Endometrial Biopsy] : Endometrial biopsy [Post-Menop. Bleeding] : post-menopausal bleeding [Risks] : risks [Benefits] : benefits [Alternatives] : alternatives [Patient] : patient [Infection] : infection [Bleeding] : bleeding [Allergic Reaction] : allergic reaction [Uterine Perforation] : uterine perforation [Pain] : pain [CONSENT OBTAINED] : written consent was obtained prior to the procedure. [Neg Pregnancy Test] : a pregnancy test was negative [Hibiclens] : Hibiclens [Paracervical Block] : A paracervical block was performed using [1%] : 1% [Without Epi] : without epinephrine [Tenaculum] : a single toothed tenaculum [Anteverted] : anteverted [Sent to Histology] : the specimen was place in buffered formalin and sent for pathlogy [Tolerated Poorly] : the patient tolerated the procedure poorly [de-identified] : Thickened endometrium

## 2020-04-03 ENCOUNTER — TRANSCRIPTION ENCOUNTER (OUTPATIENT)
Age: 60
End: 2020-04-03

## 2020-04-08 ENCOUNTER — TRANSCRIPTION ENCOUNTER (OUTPATIENT)
Age: 60
End: 2020-04-08

## 2020-04-16 ENCOUNTER — APPOINTMENT (OUTPATIENT)
Dept: GASTROENTEROLOGY | Facility: CLINIC | Age: 60
End: 2020-04-16

## 2020-04-28 ENCOUNTER — APPOINTMENT (OUTPATIENT)
Dept: GASTROENTEROLOGY | Facility: CLINIC | Age: 60
End: 2020-04-28
Payer: COMMERCIAL

## 2020-04-28 PROCEDURE — 99441: CPT

## 2020-05-05 ENCOUNTER — RX RENEWAL (OUTPATIENT)
Age: 60
End: 2020-05-05

## 2020-06-02 ENCOUNTER — RX RENEWAL (OUTPATIENT)
Age: 60
End: 2020-06-02

## 2020-07-07 ENCOUNTER — APPOINTMENT (OUTPATIENT)
Dept: OBGYN | Facility: CLINIC | Age: 60
End: 2020-07-07
Payer: COMMERCIAL

## 2020-07-07 ENCOUNTER — ASOB RESULT (OUTPATIENT)
Age: 60
End: 2020-07-07

## 2020-07-07 VITALS
DIASTOLIC BLOOD PRESSURE: 80 MMHG | HEIGHT: 64 IN | BODY MASS INDEX: 35.34 KG/M2 | SYSTOLIC BLOOD PRESSURE: 122 MMHG | WEIGHT: 207 LBS

## 2020-07-07 DIAGNOSIS — R93.89 ABNORMAL FINDINGS ON DIAGNOSTIC IMAGING OF OTHER SPECIFIED BODY STRUCTURES: ICD-10-CM

## 2020-07-07 DIAGNOSIS — Z12.39 ENCOUNTER FOR OTHER SCREENING FOR MALIGNANT NEOPLASM OF BREAST: ICD-10-CM

## 2020-07-07 PROCEDURE — 76856 US EXAM PELVIC COMPLETE: CPT | Mod: 59

## 2020-07-07 PROCEDURE — 76830 TRANSVAGINAL US NON-OB: CPT

## 2020-07-07 PROCEDURE — 99213 OFFICE O/P EST LOW 20 MIN: CPT | Mod: 25

## 2020-07-07 NOTE — HISTORY OF PRESENT ILLNESS
[Last Colonoscopy ___] : Last colonoscopy [unfilled] [Last Mammogram ___] : Last Mammogram was [unfilled] [Last Pap ___] : Last cervical pap smear was [unfilled] [Menarche ____ yo] : menarche at [unfilled] yo [Definite:  ___ (Date)] : the last menstrual period was [unfilled] [Menarche Age: ____] : age at menarche was [unfilled] [de-identified] : PELVIC US 07/07/2020 [Continuous] : no continuous [Dull] : no dull [Sharp] : no sharp [Stabbing] : no stabbing [Throbbing] : no throbbing [Burning] : no burning [Itching] : no itching [Mass] : no mass [Stinging] : no stinging [Lesion] : no lesion [Soreness] : no soreness [Discharge] : no discharge [Irregular Menses] : normal menses [Prolonged Menses] : menses of normal duration [Mass (___cm)] : no palpable mass [Localized Pain] : no localized pain [Nipple Discharge] : no nipple discharge [Diffused Pain] : no diffused pain [Hot Flashes] : no hot flashes [Skin Color Change] : no skin color change [Night Sweats] : no night sweats [Vaginal Itching] : no vaginal itching [Mood Changes] : no mood changes [Dyspareunia] : no dyspareunia

## 2020-07-07 NOTE — END OF VISIT
[FreeTextEntry3] : I, Rafal Melchor, acted solely as a scribe for Dr. Stone on this date 07/07/2020.\par All medical record entries made by the Scribe were at my, Dr. Stone's direction and personally dictated by me on  07/07/2020. I have reviewed the chart and agree that the record accurately reflects my personal performance of the history, physical exam, assessment and plan. I have also personally directed, reviewed, and agreed with the chart.\par \par

## 2020-08-05 ENCOUNTER — APPOINTMENT (OUTPATIENT)
Dept: CARDIOLOGY | Facility: CLINIC | Age: 60
End: 2020-08-05

## 2020-11-18 ENCOUNTER — APPOINTMENT (OUTPATIENT)
Dept: PULMONOLOGY | Facility: CLINIC | Age: 60
End: 2020-11-18

## 2020-11-30 ENCOUNTER — APPOINTMENT (OUTPATIENT)
Dept: PULMONOLOGY | Facility: CLINIC | Age: 60
End: 2020-11-30

## 2020-12-04 ENCOUNTER — APPOINTMENT (OUTPATIENT)
Dept: CARDIOLOGY | Facility: CLINIC | Age: 60
End: 2020-12-04
Payer: COMMERCIAL

## 2020-12-04 VITALS
HEIGHT: 64 IN | RESPIRATION RATE: 16 BRPM | SYSTOLIC BLOOD PRESSURE: 128 MMHG | HEART RATE: 91 BPM | WEIGHT: 205 LBS | DIASTOLIC BLOOD PRESSURE: 78 MMHG | BODY MASS INDEX: 35 KG/M2 | TEMPERATURE: 98 F

## 2020-12-04 PROCEDURE — 93000 ELECTROCARDIOGRAM COMPLETE: CPT

## 2020-12-04 PROCEDURE — 99072 ADDL SUPL MATRL&STAF TM PHE: CPT

## 2020-12-04 PROCEDURE — 99213 OFFICE O/P EST LOW 20 MIN: CPT

## 2020-12-07 RX ORDER — GLIPIZIDE 5 MG/1
5 TABLET, FILM COATED, EXTENDED RELEASE ORAL DAILY
Refills: 0 | Status: ACTIVE | COMMUNITY

## 2020-12-07 RX ORDER — CHROMIUM 200 MCG
25 MCG TABLET ORAL
Refills: 0 | Status: ACTIVE | COMMUNITY
Start: 2020-12-07

## 2020-12-07 RX ORDER — BIOTIN 10 MG
10000 TABLET ORAL DAILY
Refills: 0 | Status: ACTIVE | COMMUNITY
Start: 2020-12-07

## 2020-12-07 RX ORDER — LINAGLIPTIN 5 MG/1
5 TABLET, FILM COATED ORAL
Qty: 90 | Refills: 2 | Status: ACTIVE | COMMUNITY

## 2020-12-07 RX ORDER — LEVOTHYROXINE SODIUM 150 UG/1
150 TABLET ORAL
Qty: 90 | Refills: 2 | Status: ACTIVE | COMMUNITY

## 2021-02-02 ENCOUNTER — RX RENEWAL (OUTPATIENT)
Age: 61
End: 2021-02-02

## 2021-03-02 ENCOUNTER — APPOINTMENT (OUTPATIENT)
Dept: OBGYN | Facility: CLINIC | Age: 61
End: 2021-03-02
Payer: COMMERCIAL

## 2021-03-02 VITALS
TEMPERATURE: 97.4 F | BODY MASS INDEX: 35 KG/M2 | DIASTOLIC BLOOD PRESSURE: 70 MMHG | WEIGHT: 205 LBS | SYSTOLIC BLOOD PRESSURE: 130 MMHG | HEIGHT: 64 IN

## 2021-03-02 DIAGNOSIS — Z12.4 ENCOUNTER FOR SCREENING FOR MALIGNANT NEOPLASM OF CERVIX: ICD-10-CM

## 2021-03-02 DIAGNOSIS — Z01.419 ENCOUNTER FOR GYNECOLOGICAL EXAMINATION (GENERAL) (ROUTINE) W/OUT ABNORMAL FINDINGS: ICD-10-CM

## 2021-03-02 DIAGNOSIS — E66.01 MORBID (SEVERE) OBESITY DUE TO EXCESS CALORIES: ICD-10-CM

## 2021-03-02 DIAGNOSIS — Z87.42 PERSONAL HISTORY OF OTHER DISEASES OF THE FEMALE GENITAL TRACT: ICD-10-CM

## 2021-03-02 DIAGNOSIS — Z12.12 ENCOUNTER FOR SCREENING FOR MALIGNANT NEOPLASM OF RECTUM: ICD-10-CM

## 2021-03-02 DIAGNOSIS — Z78.0 ASYMPTOMATIC MENOPAUSAL STATE: ICD-10-CM

## 2021-03-02 PROCEDURE — 99396 PREV VISIT EST AGE 40-64: CPT

## 2021-03-02 PROCEDURE — 82270 OCCULT BLOOD FECES: CPT

## 2021-03-02 PROCEDURE — 81003 URINALYSIS AUTO W/O SCOPE: CPT | Mod: QW

## 2021-03-02 PROCEDURE — 99072 ADDL SUPL MATRL&STAF TM PHE: CPT

## 2021-03-09 ENCOUNTER — APPOINTMENT (OUTPATIENT)
Dept: CARDIOLOGY | Facility: CLINIC | Age: 61
End: 2021-03-09
Payer: COMMERCIAL

## 2021-03-09 VITALS
RESPIRATION RATE: 15 BRPM | HEIGHT: 64 IN | WEIGHT: 204 LBS | DIASTOLIC BLOOD PRESSURE: 80 MMHG | SYSTOLIC BLOOD PRESSURE: 132 MMHG | BODY MASS INDEX: 34.83 KG/M2 | HEART RATE: 84 BPM

## 2021-03-09 PROCEDURE — 99213 OFFICE O/P EST LOW 20 MIN: CPT | Mod: 25

## 2021-03-09 PROCEDURE — 99072 ADDL SUPL MATRL&STAF TM PHE: CPT

## 2021-03-09 PROCEDURE — 93015 CV STRESS TEST SUPVJ I&R: CPT

## 2021-03-09 PROCEDURE — 93306 TTE W/DOPPLER COMPLETE: CPT

## 2021-03-10 RX ORDER — BIOTIN 10 MG
10000 TABLET ORAL DAILY
Refills: 0 | Status: COMPLETED | COMMUNITY
Start: 2020-12-07 | End: 2021-03-10

## 2021-03-13 NOTE — DISCUSSION/SUMMARY
[FreeTextEntry1] : During this visit comprehensive counseling was given regarding the following concerns: \par \par 1 We discussed the need for proper nutrition and exercise. This includes cardiovascular and pelvic floor exercise. We discussed the role of high BMI and excessive adipose tissue as related to increased endogenous estrogen production. She was strongly encouraged to consider formal programs for sustained and progressive weight loss including Weight Watchers and even bariatric surgery. All questions were answered and support was given. \par \par 2 We discussed the importance of maintaining a proper vaccination schedule and we discussed the utility of the flu vaccine and TDaP. \par \par 3 We discussed the impact of chronic sun exposure and the risk for skin disease as a result. The benefits of a total body scan by a Dermatologist was reviewed.\par \par 4 We discussed the need for certain supplements for most people which include vitamin D3, calcium rich foods with limitation on calcium supplementation by tabular form to 600 mg by mouth daily. As part of a bone health program we recommend weight bearing exercises, and some limited sun exposure (10-15 minutes daily) to help convert vitamin D to its active form. Prescription for DEXA studies were given. \par \par 5 Given family history of ovarian cancer, we will order a pelvic sonogram for further evaluation.\par \par During this visit 20 minutes were spent face-to-face with greater than 50% of the time dedicated to counseling.

## 2021-03-13 NOTE — PHYSICAL EXAM
[Appropriately responsive] : appropriately responsive [Alert] : alert [No Acute Distress] : no acute distress [No Lymphadenopathy] : no lymphadenopathy [Soft] : soft [Non-tender] : non-tender [Non-distended] : non-distended [No HSM] : No HSM [No Lesions] : no lesions [No Mass] : no mass [Oriented x3] : oriented x3 [Examination Of The Breasts] : a normal appearance [No Discharge] : no discharge [No Masses] : no breast masses were palpable [Labia Majora] : normal [Labia Minora] : normal [Atrophy] : atrophy [Dry Mucosa] : dry mucosa [No Bleeding] : There was no active vaginal bleeding [Normal] : normal [Uterine Adnexae] : normal [No Tenderness] : no tenderness [Nl Sphincter Tone] : normal sphincter tone [FreeTextEntry9] : guaiac negative

## 2021-03-13 NOTE — HISTORY OF PRESENT ILLNESS
[Y] : Positive pregnancy history [Currently Active] : currently active [Men] : men [Mammogramdate] : 10/24/2018 [TextBox_19] : BR0 [PapSmeardate] : 01/16/2020 [TextBox_31] : NEG [LMPDate] : 2010 [PGxTotal] : 1 [[facility] : [facility] [PGHxABInduced] : 1 [FreeTextEntry1] : 2010

## 2021-03-13 NOTE — END OF VISIT
[FreeTextEntry3] : I, Ivonne Lovett, solely acted as scribe for Dr. Pako Jung on 03/02/2021.\par All medical entries made by the Scribe were at my, Dr. Jung's direction and personally dictated by me on 03/02/2021. I have reviewed the chart and agree that the record accurately reflects my personal performance of the history, physical exam, assessment and plan. I have also personally directed, reviewed, and agreed with the chart.

## 2021-03-15 ENCOUNTER — RESULT CHARGE (OUTPATIENT)
Age: 61
End: 2021-03-15

## 2021-03-15 ENCOUNTER — TRANSCRIPTION ENCOUNTER (OUTPATIENT)
Age: 61
End: 2021-03-15

## 2021-03-15 ENCOUNTER — APPOINTMENT (OUTPATIENT)
Dept: OBGYN | Facility: CLINIC | Age: 61
End: 2021-03-15
Payer: COMMERCIAL

## 2021-03-15 ENCOUNTER — ASOB RESULT (OUTPATIENT)
Age: 61
End: 2021-03-15

## 2021-03-15 DIAGNOSIS — R39.9 UNSPECIFIED SYMPTOMS AND SIGNS INVOLVING THE GENITOURINARY SYSTEM: ICD-10-CM

## 2021-03-15 PROCEDURE — 76830 TRANSVAGINAL US NON-OB: CPT

## 2021-03-15 PROCEDURE — 99072 ADDL SUPL MATRL&STAF TM PHE: CPT

## 2021-03-17 ENCOUNTER — APPOINTMENT (OUTPATIENT)
Dept: OBGYN | Facility: CLINIC | Age: 61
End: 2021-03-17
Payer: COMMERCIAL

## 2021-03-17 LAB
BILIRUB UR QL STRIP: NORMAL
GLUCOSE UR-MCNC: >1000
HCG UR QL: 0.2 EU/DL
HGB UR QL STRIP.AUTO: NORMAL
KETONES UR-MCNC: NORMAL
LEUKOCYTE ESTERASE UR QL STRIP: NORMAL
NITRITE UR QL STRIP: NORMAL
PH UR STRIP: 5
PROT UR STRIP-MCNC: NORMAL
SP GR UR STRIP: 1

## 2021-03-17 PROCEDURE — 81003 URINALYSIS AUTO W/O SCOPE: CPT | Mod: QW

## 2021-03-17 PROCEDURE — 99072 ADDL SUPL MATRL&STAF TM PHE: CPT

## 2021-04-06 ENCOUNTER — RX RENEWAL (OUTPATIENT)
Age: 61
End: 2021-04-06

## 2021-04-10 NOTE — PHYSICAL EXAM
[General Appearance - In No Acute Distress] : in no acute distress [General Appearance - Alert] : alert [Auscultation Breath Sounds / Voice Sounds] : lungs were clear to auscultation bilaterally [Heart Rate And Rhythm] : heart rate was normal and rhythm regular [Murmurs] : no murmurs [Heart Sounds] : normal S1 and S2 [Heart Sounds Gallop] : no gallops [Heart Sounds Pericardial Friction Rub] : no pericardial rub [Bowel Sounds] : normal bowel sounds [Abdomen Soft] : soft [Abdomen Mass (___ Cm)] : no abdominal mass palpated [] : no hepato-splenomegaly [Abdomen Tenderness] : non-tender Patient w/ pancreatic cancer per outpatient notes and patient was pending outpatient MRI abdomen to assess liver mets.   - will order inpatient given patient to have MRI thoracic/lumbar spine inpatient   - oncology notified of admission

## 2021-04-21 ENCOUNTER — APPOINTMENT (OUTPATIENT)
Dept: CT IMAGING | Facility: CLINIC | Age: 61
End: 2021-04-21
Payer: SELF-PAY

## 2021-04-21 ENCOUNTER — RESULT REVIEW (OUTPATIENT)
Age: 61
End: 2021-04-21

## 2021-04-21 ENCOUNTER — OUTPATIENT (OUTPATIENT)
Dept: OUTPATIENT SERVICES | Facility: HOSPITAL | Age: 61
LOS: 1 days | End: 2021-04-21
Payer: SELF-PAY

## 2021-04-21 DIAGNOSIS — Z90.721 ACQUIRED ABSENCE OF OVARIES, UNILATERAL: Chronic | ICD-10-CM

## 2021-04-21 DIAGNOSIS — E89.0 POSTPROCEDURAL HYPOTHYROIDISM: Chronic | ICD-10-CM

## 2021-04-21 DIAGNOSIS — Z00.8 ENCOUNTER FOR OTHER GENERAL EXAMINATION: ICD-10-CM

## 2021-04-21 PROCEDURE — 75571 CT HRT W/O DYE W/CA TEST: CPT

## 2021-04-21 PROCEDURE — 75571 CT HRT W/O DYE W/CA TEST: CPT | Mod: 26

## 2021-04-24 ENCOUNTER — APPOINTMENT (OUTPATIENT)
Dept: RADIOLOGY | Facility: CLINIC | Age: 61
End: 2021-04-24
Payer: COMMERCIAL

## 2021-04-24 ENCOUNTER — OUTPATIENT (OUTPATIENT)
Dept: OUTPATIENT SERVICES | Facility: HOSPITAL | Age: 61
LOS: 1 days | End: 2021-04-24
Payer: COMMERCIAL

## 2021-04-24 DIAGNOSIS — Z00.8 ENCOUNTER FOR OTHER GENERAL EXAMINATION: ICD-10-CM

## 2021-04-24 DIAGNOSIS — Z90.721 ACQUIRED ABSENCE OF OVARIES, UNILATERAL: Chronic | ICD-10-CM

## 2021-04-24 DIAGNOSIS — E89.0 POSTPROCEDURAL HYPOTHYROIDISM: Chronic | ICD-10-CM

## 2021-04-24 PROCEDURE — 77085 DXA BONE DENSITY AXL VRT FX: CPT | Mod: 26

## 2021-04-24 PROCEDURE — 77085 DXA BONE DENSITY AXL VRT FX: CPT

## 2021-04-30 ENCOUNTER — NON-APPOINTMENT (OUTPATIENT)
Age: 61
End: 2021-04-30

## 2021-05-26 ENCOUNTER — APPOINTMENT (OUTPATIENT)
Dept: CARDIOLOGY | Facility: CLINIC | Age: 61
End: 2021-05-26

## 2021-09-27 LAB
CYTOLOGY CVX/VAG DOC THIN PREP: ABNORMAL
HPV HIGH+LOW RISK DNA PNL CVX: NOT DETECTED

## 2021-11-15 NOTE — ASU PREOP CHECKLIST - ANTIBIOTIC
Patient c/o HA X few days. Was here yesterday for same complaints. No Hx of migraine. 20 G to R AC. Medicated per MD orders. Will continue to monitor.
n/a

## 2022-01-07 ENCOUNTER — APPOINTMENT (OUTPATIENT)
Dept: CARDIOLOGY | Facility: CLINIC | Age: 62
End: 2022-01-07

## 2022-02-14 ENCOUNTER — APPOINTMENT (OUTPATIENT)
Dept: CARDIOLOGY | Facility: CLINIC | Age: 62
End: 2022-02-14

## 2022-03-01 ENCOUNTER — RX RENEWAL (OUTPATIENT)
Age: 62
End: 2022-03-01

## 2022-04-06 ENCOUNTER — APPOINTMENT (OUTPATIENT)
Dept: CARDIOLOGY | Facility: CLINIC | Age: 62
End: 2022-04-06
Payer: COMMERCIAL

## 2022-04-06 ENCOUNTER — NON-APPOINTMENT (OUTPATIENT)
Age: 62
End: 2022-04-06

## 2022-04-06 VITALS
BODY MASS INDEX: 33.29 KG/M2 | TEMPERATURE: 97.5 F | HEART RATE: 77 BPM | OXYGEN SATURATION: 97 % | DIASTOLIC BLOOD PRESSURE: 84 MMHG | WEIGHT: 195 LBS | RESPIRATION RATE: 16 BRPM | HEIGHT: 64 IN | SYSTOLIC BLOOD PRESSURE: 125 MMHG

## 2022-04-06 PROCEDURE — 93000 ELECTROCARDIOGRAM COMPLETE: CPT

## 2022-04-06 PROCEDURE — 99214 OFFICE O/P EST MOD 30 MIN: CPT | Mod: 25

## 2022-04-06 NOTE — ASSESSMENT
[FreeTextEntry1] : This is a 62 year year old female here today for follow up cardiac followup evaluation. \par She has a past medical history significant for  non-insulin-dependent diabetes mellitus, mitral valve regurgitation, hypothyroidism, occasional palpitations, dyspepsia,.\par \par Today she is feeling generally well. She remains on ASA 81mg PO DAILY, Pravastatin 20mg PO DAILY and on Telmisartan 80mg PO DAILY tolerating well. \par She has been following up with her endocrinologist to help manage her DM.\par \par -Cardiac risk factors include HTN, HLD, DM.\par \par BLOOD PRESSURE:\par -BP is well controlled in today's visit.\par \par -I have discussed the importance of maintaining good BP control and reviewed the newest guidelines with the patient while re-enforcing dietary sodium restrictions to no more than 2-3 g daily, DASH diet, life style modifications as well as the goal of maintaining ideal body weight with the patient today. I have advised the patient to avoid the use of over-the-counter medications/ supplements especially NSAIDS.\par \par I have reviewed with Ms. VILLALOBOS that serious health consequences can occur when blood pressure is not well controlled and the need for strict compliance with medication and that optimal control can significantly reduce the risk of cardiovascular disease stroke, heart attack and other organ damage. They verbally expressed understanding of the fore mentioned serious health consequences to me today.\par \par BLOOD WORK:\par -Blood work was done by her PCP and a copy will be faxed to our office for our review. She states that her A1C has improved.\par \par CHOLESTEROL CONTROL:\par -Patient will continue the advised  TLC diet and to continue follow-up for treatment of hyperlipidemia and repeat blood testing with diet and exercise. I have discussed different exercises and the importance of maintenance of optimal body weight. The importance of staying within guidelines and recommendations was stressed to the patient today and they acknowledged that they understand this to me verbally.\par \par  -Ms. VILLALOBOS was educated and advised that failure to follow-up with my medical recommendations to lower cholesterol can result in severe health consequences therefore, they will continuing a low saturated and low fat diet and to avoid excessive carbohydrates to help reduce triglycerides and that lowering LDL levels is associated with a significant decrease in serious cardiac events including but not limited to heart attack stroke and overall death. We will continue lipid lowering agents as advised based on blood test results and the patient understands to call if they develop severe muscle discomfort or if they have a reddish tinted discoloration in their urine.\par \par DIABETIC CONTROL:\par I will advise the patient to keep try to stay on a low carbohydrate diet lose weight and exercise to reduce spikes in their blood sugar.  The importance of monitoring blood sugar regularly and HgBA1c level were reviewed. I have also discussed annual eye exams to prevent blindness,  monitoring urine microalbumin regularly to check for kidney damage and the importance of proper foot care and regularly checking feet to prevent sores and possibly loss of limbs was reviewed. \par \par TESTING/REPORTS:\par -EKG done Apr 06, 2022 which demonstrated regular sinus rhythm with nonspecific ST-T wave changes, BPM of 77.\par \par -She had a normal exercise stress test March 9, 2021\par \par -Echocardiogram done on 3/9/2021 mild mitral, aortic, tricuspid and pulmonic regurgitation with normal left ventricular systolic function. with normal left ventricular systolic function. EF of 68%\par \par -Electrocardiogram done January 15, 2020 demonstrated normal sinus rhythm rate 98 beats per minute and is otherwise unremarkable.\par \par -She had a normal 24 hour holter monitor on 1/23/2020 which revealed Sinus Rhythm with rare APC's with average HR 87.\par \par PLAN:\par -She will continue with her usual medications and will contact the office if she is having any complaints between now and their next follow up appointment.\par -The patient will schedule an Echo Doppler examination to evaluate murmur, left ventricular function, chamber size, and rule out hypertrophy.\par -She will follow up with her endocrinologist for management of her DM. \par \par I have discussed the plan of care with Ms. PORSCHE VILLALOBOS  and she  will follow up in 3 months. She is compliant with all of her medications.\par \par The patient understands that aerobic exercises must be increased to minutes 4 times/week and a detailed discussion of lifestyle modification was done today. \par The patient has a good understanding of the diagnosis, treatment plan and lifestyle modification. \par She will contact me at the office for any questions with their care or any changes in their health status.\par \par The plan of care was discussed with the patient with supervision physician Dr. Destin Simeon and will be carried out as noted above.\par \luciano Barry NP

## 2022-04-06 NOTE — PHYSICAL EXAM
[Well Developed] : well developed [Well Nourished] : well nourished [No Acute Distress] : no acute distress [Normal Venous Pressure] : normal venous pressure [No Carotid Bruit] : no carotid bruit [Normal S1, S2] : normal S1, S2 [No Murmur] : no murmur [No Rub] : no rub [Clear Lung Fields] : clear lung fields [Good Air Entry] : good air entry [No Respiratory Distress] : no respiratory distress  [Soft] : abdomen soft [Non Tender] : non-tender [No Masses/organomegaly] : no masses/organomegaly [Normal Bowel Sounds] : normal bowel sounds [Normal Gait] : normal gait [No Edema] : no edema [No Cyanosis] : no cyanosis [No Clubbing] : no clubbing [No Varicosities] : no varicosities [No Rash] : no rash [No Skin Lesions] : no skin lesions [Moves all extremities] : moves all extremities [No Focal Deficits] : no focal deficits [Normal Speech] : normal speech [Alert and Oriented] : alert and oriented [Normal memory] : normal memory [General Appearance - Well Developed] : well developed [Normal Appearance] : normal appearance [Well Groomed] : well groomed [General Appearance - Well Nourished] : well nourished [No Deformities] : no deformities [General Appearance - In No Acute Distress] : no acute distress [Normal Conjunctiva] : the conjunctiva exhibited no abnormalities [Normal Oral Mucosa] : normal oral mucosa [Normal Oropharynx] : normal oropharynx [Normal Jugular Venous A Waves Present] : normal jugular venous A waves present [Normal Jugular Venous V Waves Present] : normal jugular venous V waves present [No Jugular Venous Reece A Waves] : no jugular venous reece A waves [Bowel Sounds] : normal bowel sounds [Abdomen Soft] : soft [Abnormal Walk] : normal gait [Gait - Sufficient For Exercise Testing] : the gait was sufficient for exercise testing [Nail Clubbing] : no clubbing of the fingernails [Cyanosis, Localized] : no localized cyanosis [Petechial Hemorrhages (___cm)] : no petechial hemorrhages [Skin Color & Pigmentation] : normal skin color and pigmentation [Skin Turgor] : normal skin turgor [No Venous Stasis] : no venous stasis [Oriented To Time, Place, And Person] : oriented to person, place, and time [Affect] : the affect was normal [Mood] : the mood was normal [No Anxiety] : not feeling anxious [] : no respiratory distress [Respiration, Rhythm And Depth] : normal respiratory rhythm and effort [Exaggerated Use Of Accessory Muscles For Inspiration] : no accessory muscle use [Auscultation Breath Sounds / Voice Sounds] : lungs were clear to auscultation bilaterally [5th Left ICS - MCL] : palpated at the 5th LICS in the midclavicular line [Normal] : normal [No Precordial Heave] : no precordial heave was noted [Normal Rate] : normal [Rhythm Regular] : regular [Normal S1] : normal S1 [Normal S2] : normal S2 [No Gallop] : no gallop heard [I] : a grade 1 [2+] : left 2+ [No Abnormalities] : the abdominal aorta was not enlarged and no bruit was heard [No Pitting Edema] : no pitting edema present [S3] : no S3 [S4] : no S4 [Click] : no click [Pericardial Rub] : no pericardial rub [Right Carotid Bruit] : no bruit heard over the right carotid [Left Carotid Bruit] : no bruit heard over the left carotid [Right Femoral Bruit] : no bruit heard over the right femoral artery [Left Femoral Bruit] : no bruit heard over the left femoral artery

## 2022-04-06 NOTE — REASON FOR VISIT
[Follow-Up - Clinic] : a clinic follow-up of [Dyspnea] : dyspnea [Hyperlipidemia] : hyperlipidemia [Hypertension] : hypertension [Mitral Regurgitation] : mitral regurgitation [FreeTextEntry1] : NIDDM, elevated liver function tests, murmur, occasional GONZALEZ

## 2022-04-06 NOTE — DISCUSSION/SUMMARY
[FreeTextEntry1] : Dr. Simeon-(PRIOR VISIT and PMH WITH Dr. Simeon): \par This is a 61-year-old female with past medical history significant for non-insulin-dependent diabetes mellitus, mitral valve regurgitation, hypothyroidism, occasional palpitations, dyspepsia, who comes in for cardiac evaluation. She denies chest pain, shortness of breath, dizziness or syncope.\par \par The patient does get occasional dyspnea on exertion.\par \par She had a normal exercise stress test March 9, 2021.  The patient is still concerned about early coronary artery disease given her diabetic status and other cardiovascular risk profile.  She does get symptoms as noted above.  She will schedule a nuclear stress test to rule out significant myocardial ischemia.\par \par She will also have a coronary artery calcium score done.\par \par Her  was recently found to have 100% blockage of the left anterior descending artery and required multiple stents.\par \par She will have new blood work done today to check her LDL panel, SMA-20, and cholesterol panel.\par She will follow-up with me after above-noted diagnostic tests are completed.\par She is currently hemodynamically stable and asymptomatic from a cardiac standpoint.\par \par She had a normal exercise stress test March 9, 2021\par \par Electrocardiogram done January 15, 2020 demonstrated normal sinus rhythm rate 98 beats per minute and is otherwise unremarkable.\par \par The patient will followup with her endocrinologist.\par \par The patient understands that aerobic exercises must be increased to 40 minutes 4 times per week. A detailed discussion of lifestyle modification was done today. The patient has a good understanding of the diagnosis, and treatment plan. Lifestyle modification was also outlined.

## 2022-04-11 ENCOUNTER — NON-APPOINTMENT (OUTPATIENT)
Age: 62
End: 2022-04-11

## 2022-06-26 ENCOUNTER — NON-APPOINTMENT (OUTPATIENT)
Age: 62
End: 2022-06-26

## 2022-10-03 ENCOUNTER — LABORATORY RESULT (OUTPATIENT)
Age: 62
End: 2022-10-03

## 2022-10-03 ENCOUNTER — APPOINTMENT (OUTPATIENT)
Dept: CARDIOLOGY | Facility: CLINIC | Age: 62
End: 2022-10-03

## 2022-10-03 ENCOUNTER — NON-APPOINTMENT (OUTPATIENT)
Age: 62
End: 2022-10-03

## 2022-10-03 VITALS
HEIGHT: 64 IN | DIASTOLIC BLOOD PRESSURE: 74 MMHG | BODY MASS INDEX: 34.49 KG/M2 | SYSTOLIC BLOOD PRESSURE: 122 MMHG | HEART RATE: 86 BPM | RESPIRATION RATE: 16 BRPM | TEMPERATURE: 98 F | WEIGHT: 202 LBS | OXYGEN SATURATION: 97 %

## 2022-10-03 DIAGNOSIS — Z87.898 PERSONAL HISTORY OF OTHER SPECIFIED CONDITIONS: ICD-10-CM

## 2022-10-03 PROCEDURE — 99214 OFFICE O/P EST MOD 30 MIN: CPT

## 2022-10-03 PROCEDURE — 93306 TTE W/DOPPLER COMPLETE: CPT

## 2022-10-03 RX ORDER — HYOSCYAMINE SULFATE 0.38 MG/1
0.38 TABLET, EXTENDED RELEASE ORAL
Qty: 180 | Refills: 3 | Status: COMPLETED | COMMUNITY
Start: 2019-06-05 | End: 2022-10-03

## 2022-10-03 NOTE — ASSESSMENT
[FreeTextEntry1] : Prior note nurse practitioner Gill April 6, 2022::\par \par This is a 62 year year old female here today for follow up cardiac followup evaluation. \par She has a past medical history significant for  non-insulin-dependent diabetes mellitus, mitral valve regurgitation, hypothyroidism, occasional palpitations, dyspepsia,.\par \par Today she is feeling generally well. She remains on ASA 81mg PO DAILY, Pravastatin 20mg PO DAILY and on Telmisartan 80mg PO DAILY tolerating well. \par She has been following up with her endocrinologist to help manage her DM.\par \par -Cardiac risk factors include HTN, HLD, DM.\par \par BLOOD PRESSURE:\par -BP is well controlled in today's visit.\par \par -I have discussed the importance of maintaining good BP control and reviewed the newest guidelines with the patient while re-enforcing dietary sodium restrictions to no more than 2-3 g daily, DASH diet, life style modifications as well as the goal of maintaining ideal body weight with the patient today. I have advised the patient to avoid the use of over-the-counter medications/ supplements especially NSAIDS.\par \par I have reviewed with Ms. VILLALOBOS that serious health consequences can occur when blood pressure is not well controlled and the need for strict compliance with medication and that optimal control can significantly reduce the risk of cardiovascular disease stroke, heart attack and other organ damage. They verbally expressed understanding of the fore mentioned serious health consequences to me today.\par \par BLOOD WORK:\par -Blood work was done by her PCP and a copy will be faxed to our office for our review. She states that her A1C has improved.\par \par CHOLESTEROL CONTROL:\par -Patient will continue the advised  TLC diet and to continue follow-up for treatment of hyperlipidemia and repeat blood testing with diet and exercise. I have discussed different exercises and the importance of maintenance of optimal body weight. The importance of staying within guidelines and recommendations was stressed to the patient today and they acknowledged that they understand this to me verbally.\par \par  -Ms. VILLALOBOS was educated and advised that failure to follow-up with my medical recommendations to lower cholesterol can result in severe health consequences therefore, they will continuing a low saturated and low fat diet and to avoid excessive carbohydrates to help reduce triglycerides and that lowering LDL levels is associated with a significant decrease in serious cardiac events including but not limited to heart attack stroke and overall death. We will continue lipid lowering agents as advised based on blood test results and the patient understands to call if they develop severe muscle discomfort or if they have a reddish tinted discoloration in their urine.\par \par DIABETIC CONTROL:\par I will advise the patient to keep try to stay on a low carbohydrate diet lose weight and exercise to reduce spikes in their blood sugar.  The importance of monitoring blood sugar regularly and HgBA1c level were reviewed. I have also discussed annual eye exams to prevent blindness,  monitoring urine microalbumin regularly to check for kidney damage and the importance of proper foot care and regularly checking feet to prevent sores and possibly loss of limbs was reviewed. \par \par TESTING/REPORTS:\par -EKG done Apr 06, 2022 which demonstrated regular sinus rhythm with nonspecific ST-T wave changes, BPM of 77.\par \par -She had a normal exercise stress test March 9, 2021\par \par -Echocardiogram done on 3/9/2021 mild mitral, aortic, tricuspid and pulmonic regurgitation with normal left ventricular systolic function. with normal left ventricular systolic function. EF of 68%\par \par -Electrocardiogram done January 15, 2020 demonstrated normal sinus rhythm rate 98 beats per minute and is otherwise unremarkable.\par \par -She had a normal 24 hour holter monitor on 1/23/2020 which revealed Sinus Rhythm with rare APC's with average HR 87.\par \par PLAN:\par -She will continue with her usual medications and will contact the office if she is having any complaints between now and their next follow up appointment.\par -The patient will schedule an Echo Doppler examination to evaluate murmur, left ventricular function, chamber size, and rule out hypertrophy.\par -She will follow up with her endocrinologist for management of her DM. \par \par I have discussed the plan of care with Ms. PORSCHE VILLALOBOS  and she  will follow up in 3 months. She is compliant with all of her medications.\par \par The patient understands that aerobic exercises must be increased to minutes 4 times/week and a detailed discussion of lifestyle modification was done today. \par The patient has a good understanding of the diagnosis, treatment plan and lifestyle modification. \par She will contact me at the office for any questions with their care or any changes in their health status.\par \par The plan of care was discussed with the patient with supervision physician Dr. Destin Simeon and will be carried out as noted above.\par \par Jhonny BECERRA

## 2022-10-03 NOTE — REASON FOR VISIT
[CV Risk Factors and Non-Cardiac Disease] : CV risk factors and non-cardiac disease [Follow-Up - Clinic] : a clinic follow-up of [Dyspnea] : dyspnea [Hyperlipidemia] : hyperlipidemia [Hypertension] : hypertension [Mitral Regurgitation] : mitral regurgitation [FreeTextEntry1] : NIDDM, elevated liver function tests, murmur, occasional GONZALEZ

## 2022-10-03 NOTE — DISCUSSION/SUMMARY
[FreeTextEntry1] : This is a 62-year-old female with past medical history significant for non-insulin-dependent diabetes mellitus, mitral valve regurgitation, hypothyroidism, occasional palpitations, dyspepsia, who comes in for cardiac evaluation. She denies chest pain, shortness of breath, dizziness or syncope.\par Electrocardiogram done October 3, 2022 demonstrated normal sinus rhythm rate of 86 bpm and is otherwise unremarkable.\par Coronary artery calcium score done April 21, 2021 demonstrated calcium score of 0; the patient was found to have a small 2 mm perifissural nodule in the right seen in only 1 view.  The patient was informed by nurse practitioner Gill April 30, 2021 to follow-up with Dr. Judge regarding this finding.\par I have reinforced the need for her to follow-up with Dr. Judge regarding this finding and any follow-up evaluation that is required.\par The patient will have new blood work done today for lipid panel, electrolytes, and hemoglobin A1c.\par She is currently hemodynamically stable and asymptomatic from a cardiac standpoint.  She will continue on her current diet and exercise program.\par Lipid panel done March 1, 2022 demonstrated an LDL cholesterol 43 mg/dL, cholesterol 119 mg/dL, triglycerides 179 mg/dL and HDL 40 mg/dL.\par \par Normal exercise stress test March 9, 2021.  The patient is still concerned about early coronary artery disease given her diabetic status and other cardiovascular risk profile.  She does get symptoms as noted above.  She will schedule a nuclear stress test to rule out significant myocardial ischemia.\par Her  was recently found to have 100% blockage of the left anterior descending artery and required multiple stents.\par She is currently hemodynamically stable and asymptomatic from a cardiac standpoint.\par She had a normal exercise stress test March 9, 2021\par Electrocardiogram done January 15, 2020 demonstrated normal sinus rhythm rate 98 beats per minute and is otherwise unremarkable.\par The patient will followup with her endocrinologist.\par \par The patient understands that aerobic exercises must be increased to 40 minutes 4 times per week. A detailed discussion of lifestyle modification was done today. The patient has a good understanding of the diagnosis, and treatment plan. Lifestyle modification was also outlined.

## 2022-10-06 ENCOUNTER — TRANSCRIPTION ENCOUNTER (OUTPATIENT)
Age: 62
End: 2022-10-06

## 2022-10-23 PROBLEM — Z87.898 HISTORY OF PALPITATIONS: Status: RESOLVED | Noted: 2020-02-02 | Resolved: 2022-04-06

## 2022-11-03 ENCOUNTER — OUTPATIENT (OUTPATIENT)
Dept: OUTPATIENT SERVICES | Facility: HOSPITAL | Age: 62
LOS: 1 days | End: 2022-11-03
Payer: COMMERCIAL

## 2022-11-03 ENCOUNTER — APPOINTMENT (OUTPATIENT)
Dept: ULTRASOUND IMAGING | Facility: CLINIC | Age: 62
End: 2022-11-03

## 2022-11-03 DIAGNOSIS — E89.0 POSTPROCEDURAL HYPOTHYROIDISM: Chronic | ICD-10-CM

## 2022-11-03 DIAGNOSIS — Z00.8 ENCOUNTER FOR OTHER GENERAL EXAMINATION: ICD-10-CM

## 2022-11-03 DIAGNOSIS — Z90.721 ACQUIRED ABSENCE OF OVARIES, UNILATERAL: Chronic | ICD-10-CM

## 2022-11-03 PROCEDURE — 76856 US EXAM PELVIC COMPLETE: CPT

## 2022-11-03 PROCEDURE — 76856 US EXAM PELVIC COMPLETE: CPT | Mod: 26

## 2022-11-03 PROCEDURE — 76830 TRANSVAGINAL US NON-OB: CPT

## 2022-11-03 PROCEDURE — 76830 TRANSVAGINAL US NON-OB: CPT | Mod: 26

## 2022-11-17 ENCOUNTER — APPOINTMENT (OUTPATIENT)
Dept: PULMONOLOGY | Facility: CLINIC | Age: 62
End: 2022-11-17

## 2022-11-17 ENCOUNTER — APPOINTMENT (OUTPATIENT)
Dept: PULMONOLOGY | Facility: CLINIC | Age: 62
End: 2022-11-17
Payer: COMMERCIAL

## 2022-11-17 VITALS — OXYGEN SATURATION: 98 % | HEART RATE: 95 BPM | SYSTOLIC BLOOD PRESSURE: 133 MMHG | DIASTOLIC BLOOD PRESSURE: 78 MMHG

## 2022-11-17 DIAGNOSIS — Z23 ENCOUNTER FOR IMMUNIZATION: ICD-10-CM

## 2022-11-17 DIAGNOSIS — Z78.9 OTHER SPECIFIED HEALTH STATUS: ICD-10-CM

## 2022-11-17 DIAGNOSIS — D05.02 LOBULAR CARCINOMA IN SITU OF LEFT BREAST: ICD-10-CM

## 2022-11-17 DIAGNOSIS — C73 MALIGNANT NEOPLASM OF THYROID GLAND: ICD-10-CM

## 2022-11-17 LAB — POCT - HEMOGLOBIN (HGB), QUANTITATIVE, TRANSCUTANEOUS: 12.9

## 2022-11-17 PROCEDURE — 99204 OFFICE O/P NEW MOD 45 MIN: CPT | Mod: 25

## 2022-11-17 PROCEDURE — 94727 GAS DIL/WSHOT DETER LNG VOL: CPT

## 2022-11-17 PROCEDURE — 94729 DIFFUSING CAPACITY: CPT

## 2022-11-17 PROCEDURE — 94618 PULMONARY STRESS TESTING: CPT

## 2022-11-17 PROCEDURE — 88738 HGB QUANT TRANSCUTANEOUS: CPT

## 2022-11-17 PROCEDURE — 90686 IIV4 VACC NO PRSV 0.5 ML IM: CPT

## 2022-11-17 PROCEDURE — 71046 X-RAY EXAM CHEST 2 VIEWS: CPT

## 2022-11-17 PROCEDURE — G0008: CPT

## 2022-11-17 PROCEDURE — 94060 EVALUATION OF WHEEZING: CPT

## 2022-11-17 PROCEDURE — 95012 NITRIC OXIDE EXP GAS DETER: CPT

## 2022-11-17 PROCEDURE — 99205 OFFICE O/P NEW HI 60 MIN: CPT | Mod: 25

## 2022-11-17 NOTE — CONSULT LETTER
[Dear  ___] : Dear  [unfilled], [Consult Letter:] : I had the pleasure of evaluating your patient, [unfilled]. [Please see my note below.] : Please see my note below. [Consult Closing:] : Thank you very much for allowing me to participate in the care of this patient.  If you have any questions, please do not hesitate to contact me. [Sincerely,] : Sincerely, [FreeTextEntry3] : José Judge D.O., NUBIA\par  of Medicine\par [facility] [employer]\par

## 2022-11-17 NOTE — DISCUSSION/SUMMARY
[FreeTextEntry1] : 2 mm pulmonary nodule even in high risk patient based on Fleischner guidelines likely is benign and no indication for  re scan of Chest CT\par Patient does have a history of thyroid cancer breast cancer in situ\par Medullary thyroid cancer\par Exertional dyspnea likely secondary to physical deconditioning overweight\par Small airways disease\par Snoring\par consider sleep study\par Notify of any wheezing.  Notify if rescue inhaler is needed greater than 2-3 times in any week.  Avoid known triggers.\par \par \par

## 2022-11-17 NOTE — PHYSICAL EXAM
[No Acute Distress] : no acute distress [Normal Oropharynx] : normal oropharynx [II] : Mallampati Class: II [Normal Appearance] : normal appearance [No Neck Mass] : no neck mass [No JVD] : no jvd [Normal Rate/Rhythm] : normal rate/rhythm [Normal S1, S2] : normal s1, s2 [No Murmurs] : no murmurs [No Rubs] : no rubs [No Gallops] : no gallops [No Resp Distress] : no resp distress [No Acc Muscle Use] : no acc muscle use [Normal Palpation] : normal palpation [Normal Rhythm and Effort] : normal rhythm and effort [Clear to Auscultation Bilaterally] : clear to auscultation bilaterally [No Abnormalities] : no abnormalities [Benign] : benign [Normal Gait] : normal gait [No Clubbing] : no clubbing [No Cyanosis] : no cyanosis [No Edema] : no edema [FROM] : FROM [Normal Color/ Pigmentation] : normal color/ pigmentation [No Focal Deficits] : no focal deficits [Normal Affect] : normal affect [Oriented x3] : oriented x3 [TextBox_2] : Overweight

## 2022-11-17 NOTE — REVIEW OF SYSTEMS
[Diabetes] : diabetes [Negative] : Neurologic [Fever] : no fever [Fatigue] : no fatigue [Chills] : no chills [Depression] : no depression [Anxiety] : no anxiety [Panic Attacks] : no panic attacks [TextBox_30] : hx Bronchitis [TextBox_44] : HTN, HLD [TextBox_57] : iodine  , contrast [TextBox_69] : IBS [TextBox_144] : Medullary Thyroid cancer [TextBox_151] : Breast Ca in situ

## 2022-11-17 NOTE — HISTORY OF PRESENT ILLNESS
[Former] : former [Never] : never [TextBox_4] : 62-year-old female\par Pulmonary evaluation\par Abnormal chest CT findings on coronary artery chest CT scan April 2021\par No prior history of asthma pneumonia tuberculosis pulmonary embolic disease interstitial lung disease\par States that the patient has history of bronchitis\par No active respiratory symptoms\par Denies wheeze chest pain chest tightness exertional chest pain pleuritic chest pain cough purulent sputum\par Reports some exertional dyspnea primarily with stair climbing or inclines\par Essentially non-smoker\par States only smoked as a teenager\par No history of vaping or e-cigarettes\par No street chemical toxic inhalational exposures\par Past medical history\par Diabetes mellitus type 2\par Hypertension\par Hypercholesterolemia\par Dr. Peters\par Lobular carcinoma in situ left breast\par Medullary thyroid cancer\par Overweight\par \par  [TextBox_29] : teenager

## 2022-11-17 NOTE — PROCEDURE
[FreeTextEntry1] : CT heart calcium score April 21, 2021\par Visualized lungs clear\par 2 mm perifissural nodule at the right likely represents a pulmonary lymph node\par No mediastinal adenopathy\par Coronary artery calcium score was 0\par \par PFT with bronchodilator November 17, 2022\par Flow rates are normal\par Ratio 83\par No bronchodilator response at FEV1\par Lung lines normal\par % predicted\par Diffusion normal 103% predicted\par IMP normal study\par \par NIOX 33 PPB mild bronchial inflammation November 17, 2022\par \par \par Chest x-ray PA lateral 11/17/2022\par Cardiac size is normal\par Lung fields are clear without parenchymal infiltrates pleural effusions dominant pulmonary nodules\par Soft tissue bony structures unremarkable\par UNREMARKABLE\par IMPRESSION CLEAR LUNGS\par \par Pulmonary 6  minute walk study 11/17 22\par  RA \par NEg RA  O2  desaturation\par \par Flu Vaccine IM 2022

## 2022-11-23 ENCOUNTER — APPOINTMENT (OUTPATIENT)
Dept: GASTROENTEROLOGY | Facility: CLINIC | Age: 62
End: 2022-11-23

## 2022-11-23 VITALS
HEART RATE: 97 BPM | BODY MASS INDEX: 34.49 KG/M2 | DIASTOLIC BLOOD PRESSURE: 86 MMHG | SYSTOLIC BLOOD PRESSURE: 153 MMHG | WEIGHT: 202 LBS | HEIGHT: 64 IN

## 2022-11-23 DIAGNOSIS — Z12.11 ENCOUNTER FOR SCREENING FOR MALIGNANT NEOPLASM OF COLON: ICD-10-CM

## 2022-11-23 PROCEDURE — 99213 OFFICE O/P EST LOW 20 MIN: CPT

## 2022-11-23 RX ORDER — ALPRAZOLAM 0.25 MG/1
0.25 TABLET ORAL
Qty: 2 | Refills: 0 | Status: ACTIVE | COMMUNITY
Start: 2022-11-17

## 2022-11-23 NOTE — HISTORY OF PRESENT ILLNESS
[FreeTextEntry1] : Ms. PORSCHE VILLALOBOS is a 62 year old female presents for screening colonoscopy. Patient has no complaints of bowel issues, bleeding, abdominal pain, family history of colon cancer. Patient had last colonoscopy in 2017. GERD symptoms have been under good control. No significant changes in medical history. \par \par

## 2022-11-23 NOTE — PHYSICAL EXAM
[Alert] : alert [Normal Voice/Communication] : normal voice/communication [Healthy Appearing] : healthy appearing [No Acute Distress] : no acute distress [Sclera] : the sclera and conjunctiva were normal [Hearing Threshold Finger Rub Not [place]] : hearing was normal [Normal Lips/Gums] : the lips and gums were normal [Oropharynx] : the oropharynx was normal [Normal Appearance] : the appearance of the neck was normal [No Neck Mass] : no neck mass was observed [No Respiratory Distress] : no respiratory distress [No Acc Muscle Use] : no accessory muscle use [Respiration, Rhythm And Depth] : normal respiratory rhythm and effort [Auscultation Breath Sounds / Voice Sounds] : lungs were clear to auscultation bilaterally [Heart Rate And Rhythm] : heart rate was normal and rhythm regular [Normal S1, S2] : normal S1 and S2 [Murmurs] : no murmurs [Bowel Sounds] : normal bowel sounds [Abdomen Tenderness] : non-tender [No Masses] : no abdominal mass palpated [Abdomen Soft] : soft [] : no hepatosplenomegaly [Oriented To Time, Place, And Person] : oriented to person, place, and time

## 2022-11-23 NOTE — REVIEW OF SYSTEMS
Fax from: [facility]    Regarding: Quarterly Medication Review    Comments: Please review, sign, date and fax    Fax location: Md's in basket     [Negative] : Heme/Lymph

## 2022-12-04 ENCOUNTER — NON-APPOINTMENT (OUTPATIENT)
Age: 62
End: 2022-12-04

## 2023-02-01 ENCOUNTER — RX RENEWAL (OUTPATIENT)
Age: 63
End: 2023-02-01

## 2023-02-02 ENCOUNTER — NON-APPOINTMENT (OUTPATIENT)
Age: 63
End: 2023-02-02

## 2023-02-26 ENCOUNTER — NON-APPOINTMENT (OUTPATIENT)
Age: 63
End: 2023-02-26

## 2023-03-02 ENCOUNTER — APPOINTMENT (OUTPATIENT)
Dept: GASTROENTEROLOGY | Facility: AMBULATORY MEDICAL SERVICES | Age: 63
End: 2023-03-02

## 2023-03-29 ENCOUNTER — APPOINTMENT (OUTPATIENT)
Dept: CARDIOLOGY | Facility: CLINIC | Age: 63
End: 2023-03-29
Payer: COMMERCIAL

## 2023-03-29 ENCOUNTER — LABORATORY RESULT (OUTPATIENT)
Age: 63
End: 2023-03-29

## 2023-03-29 ENCOUNTER — NON-APPOINTMENT (OUTPATIENT)
Age: 63
End: 2023-03-29

## 2023-03-29 VITALS
SYSTOLIC BLOOD PRESSURE: 130 MMHG | WEIGHT: 207 LBS | BODY MASS INDEX: 35.34 KG/M2 | HEART RATE: 75 BPM | TEMPERATURE: 98 F | OXYGEN SATURATION: 98 % | RESPIRATION RATE: 16 BRPM | HEIGHT: 64 IN | DIASTOLIC BLOOD PRESSURE: 80 MMHG

## 2023-03-29 PROCEDURE — 99214 OFFICE O/P EST MOD 30 MIN: CPT | Mod: 25

## 2023-03-29 PROCEDURE — 93000 ELECTROCARDIOGRAM COMPLETE: CPT

## 2023-03-29 NOTE — DISCUSSION/SUMMARY
[FreeTextEntry1] : This is a 63-year-old female with past medical history significant for non-insulin-dependent diabetes mellitus, mitral valve regurgitation, hypothyroidism, status post COVID-19 infection December 2022, occasional palpitations, dyspepsia, who comes in for cardiac evaluation. She denies chest pain, shortness of breath, dizziness or syncope.\par Electrocardiogram done March 29, 2023 demonstrated normal sinus rhythm rate of 74 bpm is otherwise remarkable for nonspecific ST wave changes.\par The patient is just returned from vacation is feeling well.  She will have new blood work done today for lipid panel, hemoglobin A1c, and electrolytes,\par Lipid profile done October 3, 2022 demonstrated cholesterol 117, HDL of 42, triglycerides 143, LDL direct 52 mg/dL and non-HDL cholesterol 76 mg/dL with a hemoglobin A1c of 7.5.\par I recommended that she work with our registered dietitian.  She was concerned that she would have to commute to meet her, and I reassured her about telehealth works just as well.\par She also does not require aspirin for primary prevention; she also has stomach issues which may improve with aspirin therapy.\par Electrocardiogram done October 3, 2022 demonstrated normal sinus rhythm rate of 86 bpm and is otherwise unremarkable.\par Coronary artery calcium score done April 21, 2021 demonstrated calcium score of 0; the patient was found to have a small 2 mm perifissural nodule in the right seen in only 1 view.  The patient was informed by nurse practitioner Gill April 30, 2021 to follow-up with Dr. Judge regarding this finding.\par I have reinforced the need for her to follow-up with Dr. Judge regarding this finding and any follow-up evaluation that is required.\par The patient will have new blood work done today for lipid panel, electrolytes, and hemoglobin A1c.\par She is currently hemodynamically stable and asymptomatic from a cardiac standpoint.  She will continue on her current diet and exercise program.\par Lipid panel done March 1, 2022 demonstrated an LDL cholesterol 43 mg/dL, cholesterol 119 mg/dL, triglycerides 179 mg/dL and HDL 40 mg/dL.\par \par Normal exercise stress test March 9, 2021.  The patient is still concerned about early coronary artery disease given her diabetic status and other cardiovascular risk profile.  She does get symptoms as noted above.  She will schedule a nuclear stress test to rule out significant myocardial ischemia.\par Her  was recently found to have 100% blockage of the left anterior descending artery and required multiple stents.\par She is currently hemodynamically stable and asymptomatic from a cardiac standpoint.\par She had a normal exercise stress test March 9, 2021\par Electrocardiogram done January 15, 2020 demonstrated normal sinus rhythm rate 98 beats per minute and is otherwise unremarkable.\par The patient will followup with her endocrinologist.\par \par The patient understands that aerobic exercises must be increased to 40 minutes 4 times per week. A detailed discussion of lifestyle modification was done today. The patient has a good understanding of the diagnosis, and treatment plan. Lifestyle modification was also outlined.

## 2023-03-29 NOTE — PHYSICAL EXAM
[Well Developed] : well developed [Well Nourished] : well nourished [No Acute Distress] : no acute distress [Normal Venous Pressure] : normal venous pressure [No Carotid Bruit] : no carotid bruit [Normal S1, S2] : normal S1, S2 [No Murmur] : no murmur [No Rub] : no rub [Clear Lung Fields] : clear lung fields [Good Air Entry] : good air entry [No Respiratory Distress] : no respiratory distress  [Soft] : abdomen soft [Non Tender] : non-tender [No Masses/organomegaly] : no masses/organomegaly [Normal Bowel Sounds] : normal bowel sounds [Normal Gait] : normal gait [No Edema] : no edema [No Cyanosis] : no cyanosis [No Clubbing] : no clubbing [No Varicosities] : no varicosities [No Rash] : no rash [No Skin Lesions] : no skin lesions [Moves all extremities] : moves all extremities [No Focal Deficits] : no focal deficits [Normal Speech] : normal speech [Alert and Oriented] : alert and oriented [Normal memory] : normal memory [General Appearance - Well Developed] : well developed [Normal Appearance] : normal appearance [Well Groomed] : well groomed [General Appearance - Well Nourished] : well nourished [No Deformities] : no deformities [General Appearance - In No Acute Distress] : no acute distress [Normal Conjunctiva] : the conjunctiva exhibited no abnormalities [Normal Oral Mucosa] : normal oral mucosa [Normal Oropharynx] : normal oropharynx [Normal Jugular Venous A Waves Present] : normal jugular venous A waves present [Normal Jugular Venous V Waves Present] : normal jugular venous V waves present [No Jugular Venous Reece A Waves] : no jugular venous reece A waves [Bowel Sounds] : normal bowel sounds [Abdomen Soft] : soft [Abnormal Walk] : normal gait [Gait - Sufficient For Exercise Testing] : the gait was sufficient for exercise testing [Nail Clubbing] : no clubbing of the fingernails [Cyanosis, Localized] : no localized cyanosis [Petechial Hemorrhages (___cm)] : no petechial hemorrhages [Skin Color & Pigmentation] : normal skin color and pigmentation [Skin Turgor] : normal skin turgor [No Venous Stasis] : no venous stasis [Oriented To Time, Place, And Person] : oriented to person, place, and time [Affect] : the affect was normal [Mood] : the mood was normal [No Anxiety] : not feeling anxious [] : no respiratory distress [Respiration, Rhythm And Depth] : normal respiratory rhythm and effort [Exaggerated Use Of Accessory Muscles For Inspiration] : no accessory muscle use [Auscultation Breath Sounds / Voice Sounds] : lungs were clear to auscultation bilaterally [5th Left ICS - MCL] : palpated at the 5th LICS in the midclavicular line [Normal] : normal [No Precordial Heave] : no precordial heave was noted [Normal Rate] : normal [Rhythm Regular] : regular [Normal S1] : normal S1 [Normal S2] : normal S2 [No Gallop] : no gallop heard [S3] : no S3 [Click] : no click [S4] : no S4 [Pericardial Rub] : no pericardial rub [I] : a grade 1 [Right Carotid Bruit] : no bruit heard over the right carotid [Left Carotid Bruit] : no bruit heard over the left carotid [Right Femoral Bruit] : no bruit heard over the right femoral artery [Left Femoral Bruit] : no bruit heard over the left femoral artery [2+] : left 2+ [No Abnormalities] : the abdominal aorta was not enlarged and no bruit was heard [No Pitting Edema] : no pitting edema present

## 2023-04-10 RX ORDER — ICOSAPENT ETHYL 1000 MG/1
1 CAPSULE ORAL TWICE DAILY
Qty: 360 | Refills: 1 | Status: COMPLETED | COMMUNITY
Start: 2020-12-04 | End: 2023-04-10

## 2023-04-10 RX ORDER — METFORMIN ER 500 MG 500 MG/1
500 TABLET ORAL
Qty: 360 | Refills: 0 | Status: DISCONTINUED | COMMUNITY
Start: 2022-06-23 | End: 2023-04-10

## 2023-04-10 RX ORDER — ASPIRIN 81 MG/1
81 TABLET ORAL
Qty: 90 | Refills: 1 | Status: DISCONTINUED | COMMUNITY
Start: 2020-12-07 | End: 2023-04-10

## 2023-04-10 RX ORDER — SODIUM SULFATE, MAGNESIUM SULFATE, AND POTASSIUM CHLORIDE 17.75; 2.7; 2.25 G/1; G/1; G/1
1479-225-188 TABLET ORAL
Qty: 24 | Refills: 0 | Status: DISCONTINUED | COMMUNITY
Start: 2022-11-23 | End: 2023-04-10

## 2023-04-10 RX ORDER — ERGOCALCIFEROL 1.25 MG/1
1.25 MG CAPSULE, LIQUID FILLED ORAL
Qty: 12 | Refills: 0 | Status: COMPLETED | COMMUNITY
Start: 2022-08-08 | End: 2023-04-10

## 2023-04-13 ENCOUNTER — NON-APPOINTMENT (OUTPATIENT)
Age: 63
End: 2023-04-13

## 2023-08-11 ENCOUNTER — APPOINTMENT (OUTPATIENT)
Dept: ORTHOPEDIC SURGERY | Facility: CLINIC | Age: 63
End: 2023-08-11
Payer: COMMERCIAL

## 2023-08-11 DIAGNOSIS — M25.511 PAIN IN RIGHT SHOULDER: ICD-10-CM

## 2023-08-11 DIAGNOSIS — M25.811 OTHER SPECIFIED JOINT DISORDERS, RIGHT SHOULDER: ICD-10-CM

## 2023-08-11 PROCEDURE — 99204 OFFICE O/P NEW MOD 45 MIN: CPT | Mod: 25

## 2023-08-11 PROCEDURE — 99203 OFFICE O/P NEW LOW 30 MIN: CPT | Mod: 25

## 2023-08-11 PROCEDURE — 20611 DRAIN/INJ JOINT/BURSA W/US: CPT | Mod: RT

## 2023-08-11 PROCEDURE — 73030 X-RAY EXAM OF SHOULDER: CPT | Mod: RT

## 2023-08-11 NOTE — PROCEDURE
[FreeTextEntry3] : Large Joint Injection was performed because of pain and inflammation.  Anesthesia: ethyl chloride sprayed topically..  Depomedrol: An injection of Depomedrol 40 mg , 1 cc.  Lidocaine: 3 cc.  Medication was injected in the right GH joint. Patient has tried OTC's including aspirin, Ibuprofen, Aleve etc or prescription NSAIDS, and/or exercises at home and/ or physical therapy without satisfactory response and Patient has decreased mobility in the joint. After verbal consent using sterile preparation and technique. The risks, benefits, and alternatives to cortisone injection were explained in full to the patient. Risks outlined include but are not limited to infection, sepsis, bleeding, scarring, skin discoloration, temporary increase in pain, syncopal episode, failure to resolve symptoms, allergic reaction, symptom recurrence, and elevation of blood sugar in diabetics. Patient understood the risks. All questions were answered. After discussion of options, patient requested an injection. Oral informed consent was obtained and sterile prep was done of the injection site. Sterile technique was utilized for the procedure including the preparation of the solutions used for the injection. Patient tolerated the procedure well. Advised to ice the injection site this evening. Prep with alcohol locally to site. Sterile technique used. Patient tolerated procedure well. Post Procedure Instructions: Patient was advised to call if redness, pain, or fever occur and apply ice for 15 min. out of every hour for the next 12-24 hours as tolerated. patient was advised to rest the joint(s) for 7 days.  Ultrasound Guidance was used for the following reasons: prior failure or difficult injection.  Ultrasound guided injection was performed of the shoulder, visualization of the needle and placement of injection was performed without complication.

## 2023-08-11 NOTE — DISCUSSION/SUMMARY
Report called to 3W ALANNAH Harkins. Verbalizes understanding of pt condition and has no further questions.       Pineda Celis RN  09/08/22 [id number] [de-identified] : RUE: pain and decreased IR  This is a 64 yo female presenting to the office c/o ongoing right shoulder pain x 6 months with no prior injury. 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 Sleeper stretch shown in office Follow up 2 weeks    (1) We discussed a comprehensive treatment plans that included a prescription management plan involving the use of prescription strength medications to include Ibuprofen 600-800 mg TID, versus 500-650 mg Tylenol. We also discussed prescribing topical diclofenac (Voltaren gel) as well as once daily Meloxicam 15 mg. (2) The patient has More Than One chronic injuries/illnesses as outlined, discussed, and documented by ICD 10 codes listed, as well as the HPI and Plan section. There is a moderate risk of morbidity with further treatment, especially from use of prescription strength medications and possible side effects of these medications which include upset stomach and cardiac/renal issues with long term use were discussed. (3) I recommended that the patient follow-up with their medical physician to discuss any significant specific potential issues with long term use such as interactions with current medications or with exacerbation of underlying medical morbidities.   Attestation: I, Bel ALVAREZ'Sury , attest that this documentation has been prepared under the direction and in the presence of Provider Stew Scruggs MD. The documentation recorded by the scribe, in my presence, accurately reflects the service I personally performed, and the decisions made by me with my edits as appropriate. Stew Scruggs MD

## 2023-08-11 NOTE — PHYSICAL EXAM
[Right] : right shoulder [No bony abnormalities] : No bony abnormalities [Type 2 acromion] : Type 2 acromion [de-identified] : Constitutional: The general appearance of the patient is well developed, well nourished, no deformities and well groomed. Normal  Gait: Gait and function is as follows: normal gait.  Skin: Head and neck visualized skin is normal. Left upper extremity visualized skin is normal. Right upper extremity visualized skin is normal. Thoracic Skin of the thoracic spine shows visualized skin is normal.  Cardiovascular: palpable radial pulse bilaterally, good capillary refill in digits of the bilateral upper extremities and no temperature or color changes in the bilateral upper extremities.  Lymphatic: Normal Palpation of lymph nodes in the cervical.  Neurologic: fine motor control in the bilateral upper extremities is intact. Deep Tendon Reflexes in Upper and Lower Extremities Negative Chowdhury's in the bilateral upper extremities. The patient is oriented to time, place and person. Sensation to light touch intact in the bilateral upper extremities. Mood and Affect is normal.  Right Shoulder: Inspection of the shoulder/upper arm is as follows: no scapula winging, no biceps deformity and no AC joint deformity. Palpation of the shoulder/upper arm is as follows: There is tenderness at the proximal biceps tendon. Range of motion of the shoulder is as follows: Pain with internal rotation, external rotation, abduction and forward flexion. Strength of the shoulder is as follows: Supraspinatus 4/5. External Rotation 4/5. Internal Rotation 4/5. Deltoid 5/5 Ligament Stability and Special Tests of the shoulder is as follows: Neer test is positive. Dowd' test is positive. Speed's test is positive  Left Shoulder: Inspection of the shoulder/upper arm is as follows: There is a full, pain-free, stable range of motion of the shoulder with normal strength and no tenderness to palpation.  Neck:  Inspection / Palpation of the cervical spine is as follows: mild paracervical tenderness. Range of motion of the cervical spine is as follows: moderately decreased range of motion of the cervical spine. Stability testing for the cervical spine is as follows Stable range of motion.  Back, including spine: Inspection / Palpation of the thoracic/lumbar spine is as follows: There is a full, pain free, stable range of motion of the thoracic spine with a normal tone and not tenderness to palpation..

## 2023-08-11 NOTE — HISTORY OF PRESENT ILLNESS
[de-identified] : This is a 62 yo female presenting to the office c/o ongoing right shoulder pain x 6 months with no prior injury. Pain is described as constant, severe in quality. Currently pain is 7/10 and non-radiating. Patient reports pain has been getting progressively worse. Pain is worse at night. Overall pain does improve with rest and ice. Patient denies any numbness or tingling.

## 2023-10-02 ENCOUNTER — OFFICE (OUTPATIENT)
Facility: LOCATION | Age: 63
Setting detail: OPHTHALMOLOGY
End: 2023-10-02
Payer: MEDICARE

## 2023-10-02 ENCOUNTER — RX ONLY (RX ONLY)
Age: 63
End: 2023-10-02

## 2023-10-02 DIAGNOSIS — H35.371: ICD-10-CM

## 2023-10-02 DIAGNOSIS — H43.813: ICD-10-CM

## 2023-10-02 DIAGNOSIS — H43.393: ICD-10-CM

## 2023-10-02 DIAGNOSIS — E11.3293: ICD-10-CM

## 2023-10-02 PROCEDURE — 99213 OFFICE O/P EST LOW 20 MIN: CPT | Performed by: OPTOMETRIST

## 2023-10-02 PROCEDURE — 92250 FUNDUS PHOTOGRAPHY W/I&R: CPT | Performed by: OPTOMETRIST

## 2023-10-02 ASSESSMENT — CONFRONTATIONAL VISUAL FIELD TEST (CVF)
OS_FINDINGS: FULL
OD_FINDINGS: FULL

## 2023-10-02 ASSESSMENT — TONOMETRY
OS_IOP_MMHG: 17
OD_IOP_MMHG: 16

## 2023-10-09 ASSESSMENT — REFRACTION_AUTOREFRACTION
OS_AXIS: 107
OS_CYLINDER: +0.75
OD_CYLINDER: +0.75
OD_SPHERE: -2.25
OD_AXIS: 052
OS_SPHERE: -2.50

## 2023-10-09 ASSESSMENT — VISUAL ACUITY
OS_BCVA: 20/20-2
OD_BCVA: 20/25-2

## 2023-10-09 ASSESSMENT — SPHEQUIV_DERIVED
OD_SPHEQUIV: -1.875
OS_SPHEQUIV: -2.125

## 2023-10-13 ENCOUNTER — RX RENEWAL (OUTPATIENT)
Age: 63
End: 2023-10-13

## 2023-10-23 ENCOUNTER — OFFICE (OUTPATIENT)
Facility: LOCATION | Age: 63
Setting detail: OPHTHALMOLOGY
End: 2023-10-23
Payer: MEDICARE

## 2023-10-23 DIAGNOSIS — H35.371: ICD-10-CM

## 2023-10-23 DIAGNOSIS — Z13.5: ICD-10-CM

## 2023-10-23 DIAGNOSIS — H43.813: ICD-10-CM

## 2023-10-23 DIAGNOSIS — H43.393: ICD-10-CM

## 2023-10-23 DIAGNOSIS — E11.3293: ICD-10-CM

## 2023-10-23 PROBLEM — H35.033 HYPERTENSIVE RETINOPATHY; BOTH EYES: Status: ACTIVE | Noted: 2023-10-02

## 2023-10-23 PROBLEM — H25.13 CATARACT SENILE NUCLEAR SCLEROSIS; BOTH EYES: Status: ACTIVE | Noted: 2023-10-02

## 2023-10-23 PROCEDURE — 99213 OFFICE O/P EST LOW 20 MIN: CPT | Performed by: OPTOMETRIST

## 2023-10-23 PROCEDURE — 92250 FUNDUS PHOTOGRAPHY W/I&R: CPT | Mod: GY | Performed by: OPTOMETRIST

## 2023-10-23 ASSESSMENT — TONOMETRY
OS_IOP_MMHG: 16
OD_IOP_MMHG: 16

## 2023-10-23 ASSESSMENT — CONFRONTATIONAL VISUAL FIELD TEST (CVF)
OD_FINDINGS: FULL
OS_FINDINGS: FULL

## 2023-10-25 ASSESSMENT — REFRACTION_AUTOREFRACTION
OD_CYLINDER: +0.50
OD_SPHERE: -2.75
OD_AXIS: 67
OS_SPHERE: -2.25
OS_CYLINDER: +1.00
OS_AXIS: 85

## 2023-10-25 ASSESSMENT — REFRACTION_CURRENTRX
OD_SPHERE: -2.25
OD_OVR_VA: 20/
OD_AXIS: 50
OS_AXIS: 102
OS_OVR_VA: 20/
OS_SPHERE: -2.75
OD_CYLINDER: +0.50
OD_VPRISM_DIRECTION: SV
OS_CYLINDER: +1.25
OS_VPRISM_DIRECTION: SV

## 2023-10-25 ASSESSMENT — SPHEQUIV_DERIVED
OS_SPHEQUIV: -1.75
OD_SPHEQUIV: -2.5

## 2023-10-25 ASSESSMENT — VISUAL ACUITY
OS_BCVA: 20/25-2
OD_BCVA: 20/25-2

## 2023-11-08 ENCOUNTER — APPOINTMENT (OUTPATIENT)
Dept: CARDIOLOGY | Facility: CLINIC | Age: 63
End: 2023-11-08
Payer: MEDICARE

## 2023-11-08 VITALS
WEIGHT: 199 LBS | RESPIRATION RATE: 16 BRPM | SYSTOLIC BLOOD PRESSURE: 130 MMHG | HEIGHT: 64 IN | TEMPERATURE: 97.6 F | OXYGEN SATURATION: 96 % | BODY MASS INDEX: 33.97 KG/M2 | HEART RATE: 93 BPM | DIASTOLIC BLOOD PRESSURE: 82 MMHG

## 2023-11-08 DIAGNOSIS — R06.09 OTHER FORMS OF DYSPNEA: ICD-10-CM

## 2023-11-08 DIAGNOSIS — R01.1 CARDIAC MURMUR, UNSPECIFIED: ICD-10-CM

## 2023-11-08 PROCEDURE — 99213 OFFICE O/P EST LOW 20 MIN: CPT | Mod: 25

## 2023-11-08 PROCEDURE — 93015 CV STRESS TEST SUPVJ I&R: CPT

## 2023-11-16 ENCOUNTER — APPOINTMENT (OUTPATIENT)
Dept: CARDIOLOGY | Facility: CLINIC | Age: 63
End: 2023-11-16

## 2023-11-25 ENCOUNTER — OUTPATIENT (OUTPATIENT)
Dept: OUTPATIENT SERVICES | Facility: HOSPITAL | Age: 63
LOS: 1 days | End: 2023-11-25
Payer: MEDICARE

## 2023-11-25 ENCOUNTER — APPOINTMENT (OUTPATIENT)
Dept: ULTRASOUND IMAGING | Facility: CLINIC | Age: 63
End: 2023-11-25
Payer: MEDICARE

## 2023-11-25 ENCOUNTER — RESULT REVIEW (OUTPATIENT)
Age: 63
End: 2023-11-25

## 2023-11-25 DIAGNOSIS — Z12.31 ENCOUNTER FOR SCREENING MAMMOGRAM FOR MALIGNANT NEOPLASM OF BREAST: ICD-10-CM

## 2023-11-25 DIAGNOSIS — E89.0 POSTPROCEDURAL HYPOTHYROIDISM: Chronic | ICD-10-CM

## 2023-11-25 DIAGNOSIS — Z86.000 PERSONAL HISTORY OF IN-SITU NEOPLASM OF BREAST: ICD-10-CM

## 2023-11-25 DIAGNOSIS — Z90.721 ACQUIRED ABSENCE OF OVARIES, UNILATERAL: Chronic | ICD-10-CM

## 2023-11-25 DIAGNOSIS — Z00.8 ENCOUNTER FOR OTHER GENERAL EXAMINATION: ICD-10-CM

## 2023-11-25 DIAGNOSIS — Z12.39 ENCOUNTER FOR OTHER SCREENING FOR MALIGNANT NEOPLASM OF BREAST: ICD-10-CM

## 2023-11-25 PROCEDURE — 76830 TRANSVAGINAL US NON-OB: CPT

## 2023-11-25 PROCEDURE — 76856 US EXAM PELVIC COMPLETE: CPT

## 2023-11-25 PROCEDURE — 76856 US EXAM PELVIC COMPLETE: CPT | Mod: 26

## 2023-11-25 PROCEDURE — 76830 TRANSVAGINAL US NON-OB: CPT | Mod: 26

## 2024-01-09 ENCOUNTER — RX RENEWAL (OUTPATIENT)
Age: 64
End: 2024-01-09

## 2024-02-13 ENCOUNTER — NON-APPOINTMENT (OUTPATIENT)
Age: 64
End: 2024-02-13

## 2024-02-27 ENCOUNTER — APPOINTMENT (OUTPATIENT)
Dept: GASTROENTEROLOGY | Facility: AMBULATORY MEDICAL SERVICES | Age: 64
End: 2024-02-27
Payer: COMMERCIAL

## 2024-02-27 ENCOUNTER — RESULT REVIEW (OUTPATIENT)
Age: 64
End: 2024-02-27

## 2024-02-27 PROCEDURE — 43239 EGD BIOPSY SINGLE/MULTIPLE: CPT | Mod: GC,59

## 2024-02-27 PROCEDURE — 45378 DIAGNOSTIC COLONOSCOPY: CPT | Mod: GC

## 2024-03-19 ENCOUNTER — RX RENEWAL (OUTPATIENT)
Age: 64
End: 2024-03-19

## 2024-04-10 ENCOUNTER — NON-APPOINTMENT (OUTPATIENT)
Age: 64
End: 2024-04-10

## 2024-04-16 RX ORDER — PRAVASTATIN SODIUM 20 MG/1
20 TABLET ORAL DAILY
Qty: 1 | Refills: 1 | Status: ACTIVE | COMMUNITY
Start: 2018-01-08 | End: 1900-01-01

## 2024-04-16 RX ORDER — TELMISARTAN 80 MG/1
80 TABLET ORAL
Qty: 90 | Refills: 1 | Status: ACTIVE | COMMUNITY
Start: 2018-07-02 | End: 1900-01-01

## 2024-04-17 DIAGNOSIS — K21.9 GASTRO-ESOPHAGEAL REFLUX DISEASE W/OUT ESOPHAGITIS: ICD-10-CM

## 2024-04-17 RX ORDER — PANTOPRAZOLE 40 MG/1
40 TABLET, DELAYED RELEASE ORAL
Qty: 90 | Refills: 3 | Status: ACTIVE | COMMUNITY
Start: 2019-06-05 | End: 1900-01-01

## 2024-05-15 ENCOUNTER — NON-APPOINTMENT (OUTPATIENT)
Age: 64
End: 2024-05-15

## 2024-07-01 ENCOUNTER — NON-APPOINTMENT (OUTPATIENT)
Age: 64
End: 2024-07-01

## 2024-07-02 ENCOUNTER — LABORATORY RESULT (OUTPATIENT)
Age: 64
End: 2024-07-02

## 2024-07-02 ENCOUNTER — APPOINTMENT (OUTPATIENT)
Dept: CARDIOLOGY | Facility: CLINIC | Age: 64
End: 2024-07-02

## 2024-07-02 ENCOUNTER — NON-APPOINTMENT (OUTPATIENT)
Age: 64
End: 2024-07-02

## 2024-07-02 VITALS
DIASTOLIC BLOOD PRESSURE: 78 MMHG | RESPIRATION RATE: 16 BRPM | TEMPERATURE: 98 F | WEIGHT: 200 LBS | SYSTOLIC BLOOD PRESSURE: 127 MMHG | OXYGEN SATURATION: 97 % | BODY MASS INDEX: 34.15 KG/M2 | HEART RATE: 79 BPM | HEIGHT: 64 IN

## 2024-07-02 DIAGNOSIS — E11.9 TYPE 2 DIABETES MELLITUS W/OUT COMPLICATIONS: ICD-10-CM

## 2024-07-02 DIAGNOSIS — I34.0 NONRHEUMATIC MITRAL (VALVE) INSUFFICIENCY: ICD-10-CM

## 2024-07-02 DIAGNOSIS — I73.9 PERIPHERAL VASCULAR DISEASE, UNSPECIFIED: ICD-10-CM

## 2024-07-02 DIAGNOSIS — E78.1 PURE HYPERGLYCERIDEMIA: ICD-10-CM

## 2024-07-02 DIAGNOSIS — R60.0 LOCALIZED EDEMA: ICD-10-CM

## 2024-07-02 DIAGNOSIS — I10 ESSENTIAL (PRIMARY) HYPERTENSION: ICD-10-CM

## 2024-07-02 DIAGNOSIS — E78.00 PURE HYPERCHOLESTEROLEMIA, UNSPECIFIED: ICD-10-CM

## 2024-07-02 PROCEDURE — 93000 ELECTROCARDIOGRAM COMPLETE: CPT

## 2024-07-02 PROCEDURE — G2211 COMPLEX E/M VISIT ADD ON: CPT

## 2024-07-02 PROCEDURE — 93922 UPR/L XTREMITY ART 2 LEVELS: CPT

## 2024-07-02 PROCEDURE — ZZZZZ: CPT

## 2024-07-02 PROCEDURE — 99214 OFFICE O/P EST MOD 30 MIN: CPT

## 2024-07-12 PROBLEM — I73.9 INTERMITTENT CLAUDICATION: Status: ACTIVE | Noted: 2024-07-12

## 2024-08-06 ENCOUNTER — APPOINTMENT (OUTPATIENT)
Dept: CARDIOLOGY | Facility: CLINIC | Age: 64
End: 2024-08-06

## 2024-08-06 PROCEDURE — G2211 COMPLEX E/M VISIT ADD ON: CPT

## 2024-08-06 PROCEDURE — 93306 TTE W/DOPPLER COMPLETE: CPT

## 2024-08-06 PROCEDURE — 99214 OFFICE O/P EST MOD 30 MIN: CPT

## 2024-08-06 NOTE — ASSESSMENT
[FreeTextEntry1] : Prior no nurse practitioner Akil July 2, 2024::  This is a 64-year-old female here today for follow-up cardiac evaluation.  She has a past medical history significant for non-insulin-dependent diabetes mellitus, mitral valve regurgitation, hypothyroidism, occasional palpitations, dyspepsia.  Cardiac risk factors include HTN, HLD, DM.  HPI: She is feeling generally well today and denies chest pain, dizziness, heart palpitations, recent episodes of syncope or falls, SOB, or dyspnea at this time. She does report episode of bilateral ankle swelling over the weekend that started at a birthday party. She has begun wearing compression socks and states that the swelling does improve with elevation and her ankles aren't swollen when she wakes up in the morning. She also reports taking Advil consistently for the past few days in a row due to back pain. She has made an appointment with a vascular doctor near her home for the end of July.   Current Medications: ASA 81 mg PO DAILY, Pravastatin 40 mg PO DAILY, and Telmisartan 80 mg PO DAILY.   She has been following up with her endocrinologist to help manage her DM.  She does intermittent fasting eating from 12-8 pm only.    BLOOD PRESSURE: -BP is controlled in today's visit.   BLOOD WORK: -New blood work was done 07/02/2024 to evaluate lipid profile, CBC, BMP, hepatic function, A1C and TSH. -Lipid panel done November 2023 demonstrated cholesterol 110, triglycerides 168, LDL 30, HDL 46, LDL direct 45. -Lipid panel done March 2023 demonstrated triglyceride 198, cholesterol 128, HDL 42, LDL 45, non-HDL 85, LDL direct 59.   TESTING/REPORTS: -TATIANA done 07/02/2024 demonstrated incompressible study right side 1.43 and left side 1.58.   -EKG done 07/02/2024 demonstrated regular sinus rhythm rate 79 BPM otherwise unremarkable.   -The patient had a normal exercise stress test November 8, 2023.  -Electrocardiogram done March 29, 2023 demonstrated normal sinus rhythm rate of 74 bpm is otherwise remarkable for nonspecific ST wave changes.  -Echocardiogram done October 2022 demonstrated normal left ventricular systolic function EF 66%, minimal to mild mitral regurgitation, minimal tricuspid regurgitation, physiologic pulmonic regurgitation.  -EKG done 10/03/2022 demonstrated regular sinus rhythm rate 86 BPM otherwise unremarkable.   -EKG done Apr 06, 2022 which demonstrated regular sinus rhythm with nonspecific ST-T wave changes, BPM of 77.  -She had a normal exercise stress test March 9, 2021  -Echocardiogram done on 3/9/2021 mild mitral, aortic, tricuspid and pulmonic regurgitation with normal left ventricular systolic function. with normal left ventricular systolic function. EF of 68%  -Electrocardiogram done January 15, 2020 demonstrated normal sinus rhythm rate 98 beats per minute and is otherwise unremarkable.  -She had a normal 24-hour holter monitor on 1/23/2020 which revealed Sinus Rhythm with rare APC's with average HR 87.  PLAN: -She will continue with her usual medications and will contact the office if she is having any complaints between now and their next follow up appointment. -The patient will schedule an Echo Doppler examination to evaluate murmur, left ventricular function, chamber size, and rule out hypertrophy. -She will follow up with her endocrinologist for management of her DM.   I have discussed the plan of care with PORSCHE VILLALOBOS and she will follow up in 1-2 months. They are compliant with all of their medications.     The patient understands that aerobic exercises must be increased to 40 minutes 4 times/week and a detailed discussion of lifestyle modification was done today.   The patient has a good understanding of the diagnosis, treatment plan and lifestyle modification.   They will contact me at the office for any questions with their care or any changes in their health status.   The patient was discussed with supervision physician Dr. Destin Simeon at the time of the visit and the plan of care will be carried out as noted above.     CHACE Barnard NP

## 2024-08-06 NOTE — REASON FOR VISIT
[CV Risk Factors and Non-Cardiac Disease] : CV risk factors and non-cardiac disease [Follow-Up - Clinic] : a clinic follow-up of [Dyspnea] : dyspnea [Hyperlipidemia] : hyperlipidemia [Hypertension] : hypertension [Mitral Regurgitation] : mitral regurgitation [FreeTextEntry3] : Dr. Jimmy Kellogg [FreeTextEntry1] : NIDDM, elevated liver function tests, murmur, occasional GONZALEZ

## 2024-08-06 NOTE — PHYSICAL EXAM
[Well Developed] : well developed [Well Nourished] : well nourished [No Acute Distress] : no acute distress [Normal Venous Pressure] : normal venous pressure [No Carotid Bruit] : no carotid bruit [Normal S1, S2] : normal S1, S2 [No Rub] : no rub [I] : a grade 1 [___ +] : bilateral [unfilled]U+ pitting edema to the ankles [Clear Lung Fields] : clear lung fields [Good Air Entry] : good air entry [No Respiratory Distress] : no respiratory distress  [Soft] : abdomen soft [Non Tender] : non-tender [No Masses/organomegaly] : no masses/organomegaly [Normal Bowel Sounds] : normal bowel sounds [Normal Gait] : normal gait [No Edema] : no edema [No Cyanosis] : no cyanosis [No Clubbing] : no clubbing [No Varicosities] : no varicosities [No Rash] : no rash [No Skin Lesions] : no skin lesions [Moves all extremities] : moves all extremities [No Focal Deficits] : no focal deficits [Normal Speech] : normal speech [Alert and Oriented] : alert and oriented [Normal memory] : normal memory [General Appearance - Well Developed] : well developed [Normal Appearance] : normal appearance [Well Groomed] : well groomed [General Appearance - Well Nourished] : well nourished [No Deformities] : no deformities [General Appearance - In No Acute Distress] : no acute distress [Normal Conjunctiva] : the conjunctiva exhibited no abnormalities [Normal Oral Mucosa] : normal oral mucosa [Normal Oropharynx] : normal oropharynx [Normal Jugular Venous A Waves Present] : normal jugular venous A waves present [Normal Jugular Venous V Waves Present] : normal jugular venous V waves present [No Jugular Venous Reece A Waves] : no jugular venous reece A waves [Bowel Sounds] : normal bowel sounds [Abdomen Soft] : soft [Abnormal Walk] : normal gait [Gait - Sufficient For Exercise Testing] : the gait was sufficient for exercise testing [Nail Clubbing] : no clubbing of the fingernails [Cyanosis, Localized] : no localized cyanosis [Petechial Hemorrhages (___cm)] : no petechial hemorrhages [Skin Color & Pigmentation] : normal skin color and pigmentation [Skin Turgor] : normal skin turgor [No Venous Stasis] : no venous stasis [Oriented To Time, Place, And Person] : oriented to person, place, and time [Affect] : the affect was normal [Mood] : the mood was normal [No Anxiety] : not feeling anxious [] : no respiratory distress [Respiration, Rhythm And Depth] : normal respiratory rhythm and effort [Exaggerated Use Of Accessory Muscles For Inspiration] : no accessory muscle use [Auscultation Breath Sounds / Voice Sounds] : lungs were clear to auscultation bilaterally [5th Left ICS - MCL] : palpated at the 5th LICS in the midclavicular line [Normal] : normal [No Precordial Heave] : no precordial heave was noted [Normal Rate] : normal [Rhythm Regular] : regular [Normal S1] : normal S1 [Normal S2] : normal S2 [No Gallop] : no gallop heard [Click] : a ~M click was heard [II] : a grade 2 [2+] : left 2+ [No Abnormalities] : the abdominal aorta was not enlarged and no bruit was heard [No Pitting Edema] : no pitting edema present [S3] : no S3 [S4] : no S4 [Pericardial Rub] : no pericardial rub [Right Carotid Bruit] : no bruit heard over the right carotid [Left Carotid Bruit] : no bruit heard over the left carotid [Right Femoral Bruit] : no bruit heard over the right femoral artery [Left Femoral Bruit] : no bruit heard over the left femoral artery

## 2024-08-06 NOTE — DISCUSSION/SUMMARY
[FreeTextEntry1] : This is a 64-year-old female with past medical history significant for non-insulin-dependent diabetes mellitus, mitral valve regurgitation, hypothyroidism, status post COVID-19 infection December 2022, occasional palpitations, dyspepsia, who comes in for cardiac evaluation.  The patient was seen here July 2, 2020 for because of bilateral pedal edema. The patient has no evidence for pedal edema on examination today. She denies chest pain, shortness of breath, dizziness or syncope.  Lipid panel done July 2, 2024 demonstrated cholesterol 127, HDL of 43, triglycerides under 28, LDL calculated 62, non-HDL cholesterol 84, LDL direct 61 mg/dL.  Hemoglobin A1c 6.9, and TSH of 0.68. The patient had a normal exercise stress test November 8, 2023. The patient started intermittent fasting which is helped her lose some weight, and her focus is improving her diabetic/glycemic control. I have once again recommended she work with our registered dietitian to achieve these goals. Electrocardiogram done March 29, 2023 demonstrated normal sinus rhythm rate of 74 bpm is otherwise remarkable for nonspecific ST wave changes. Lipid profile done October 3, 2022 demonstrated cholesterol 117, HDL of 42, triglycerides 143, LDL direct 52 mg/dL and non-HDL cholesterol 76 mg/dL with a hemoglobin A1c of 7.5. She also does not require aspirin for primary prevention; she also has stomach issues which may improve with aspirin therapy. Electrocardiogram done October 3, 2022 demonstrated normal sinus rhythm rate of 86 bpm and is otherwise unremarkable. Coronary artery calcium score done April 21, 2021 demonstrated calcium score of 0; the patient was found to have a small 2 mm perifissural nodule in the right seen in only 1 view.   The patient was informed by nurse practitioner Gill April 30, 2021 to follow-up with Dr. Judge regarding this finding. I have reinforced the need for her to follow-up with Dr. Judge regarding this finding and any follow-up evaluation that is required. The patient will have new blood work done today for lipid panel, electrolytes, and hemoglobin A1c. She is currently hemodynamically stable and asymptomatic from a cardiac standpoint.  She will continue on her current diet and exercise program. Lipid panel done March 1, 2022 demonstrated an LDL cholesterol 43 mg/dL, cholesterol 119 mg/dL, triglycerides 179 mg/dL and HDL 40 mg/dL. Normal exercise stress test March 9, 2021.   The patient is still concerned about early coronary artery disease given her diabetic status and other cardiovascular risk profile.  She does get symptoms as noted above.   Her  was recently found to have 100% blockage of the left anterior descending artery and required multiple stents. She is currently hemodynamically stable and asymptomatic from a cardiac standpoint. She had a normal exercise stress test March 9, 2021 Electrocardiogram done January 15, 2020 demonstrated normal sinus rhythm rate 98 beats per minute and is otherwise unremarkable. The patient will follow-up with her endocrinologist.  The patient understands that aerobic exercises must be increased to 40 minutes 4 times per week. A detailed discussion of lifestyle modification was done today. The patient has a good understanding of the diagnosis, and treatment plan. Lifestyle modification was also outlined.

## 2024-08-06 NOTE — REVIEW OF SYSTEMS
[Lower Ext Edema] : lower extremity edema [Negative] : Heme/Lymph [SOB] : no shortness of breath [Dyspnea on exertion] : not dyspnea during exertion [Chest Discomfort] : no chest discomfort [Leg Claudication] : no intermittent leg claudication [Palpitations] : no palpitations [Orthopnea] : no orthopnea [PND] : no PND [Syncope] : no syncope [FreeTextEntry5] : +1 bilateral ankles

## 2024-08-12 ENCOUNTER — TRANSCRIPTION ENCOUNTER (OUTPATIENT)
Age: 64
End: 2024-08-12

## 2025-02-13 ENCOUNTER — APPOINTMENT (OUTPATIENT)
Dept: CARDIOLOGY | Facility: CLINIC | Age: 65
End: 2025-02-13
Payer: MEDICARE

## 2025-02-13 VITALS
RESPIRATION RATE: 16 BRPM | HEIGHT: 64 IN | WEIGHT: 200 LBS | HEART RATE: 87 BPM | TEMPERATURE: 98 F | OXYGEN SATURATION: 96 % | SYSTOLIC BLOOD PRESSURE: 127 MMHG | DIASTOLIC BLOOD PRESSURE: 82 MMHG | BODY MASS INDEX: 34.15 KG/M2

## 2025-02-13 DIAGNOSIS — E66.811 OBESITY, CLASS 1: ICD-10-CM

## 2025-02-13 DIAGNOSIS — R06.09 OTHER FORMS OF DYSPNEA: ICD-10-CM

## 2025-02-13 DIAGNOSIS — E11.9 TYPE 2 DIABETES MELLITUS W/OUT COMPLICATIONS: ICD-10-CM

## 2025-02-13 DIAGNOSIS — Z87.898 PERSONAL HISTORY OF OTHER SPECIFIED CONDITIONS: ICD-10-CM

## 2025-02-13 DIAGNOSIS — I10 ESSENTIAL (PRIMARY) HYPERTENSION: ICD-10-CM

## 2025-02-13 DIAGNOSIS — E78.00 PURE HYPERCHOLESTEROLEMIA, UNSPECIFIED: ICD-10-CM

## 2025-02-13 PROCEDURE — 99213 OFFICE O/P EST LOW 20 MIN: CPT | Mod: 25

## 2025-02-13 PROCEDURE — 93015 CV STRESS TEST SUPVJ I&R: CPT

## 2025-05-03 ENCOUNTER — NON-APPOINTMENT (OUTPATIENT)
Age: 65
End: 2025-05-03

## 2025-05-05 ENCOUNTER — APPOINTMENT (OUTPATIENT)
Dept: CARDIOLOGY | Facility: CLINIC | Age: 65
End: 2025-05-05
Payer: MEDICARE

## 2025-05-05 DIAGNOSIS — E78.00 PURE HYPERCHOLESTEROLEMIA, UNSPECIFIED: ICD-10-CM

## 2025-05-05 DIAGNOSIS — E78.1 PURE HYPERGLYCERIDEMIA: ICD-10-CM

## 2025-05-05 DIAGNOSIS — I10 ESSENTIAL (PRIMARY) HYPERTENSION: ICD-10-CM

## 2025-05-05 PROCEDURE — 97802 MEDICAL NUTRITION INDIV IN: CPT | Mod: 2W

## 2025-05-28 NOTE — ASU DISCHARGE PLAN (ADULT/PEDIATRIC) - PROCEDURE
Cervical dilation, hysteroscopy, myosure polypectomy and curettage
Yes - the patient is able to be screened

## 2025-06-30 ENCOUNTER — RX RENEWAL (OUTPATIENT)
Age: 65
End: 2025-06-30

## 2025-07-08 ENCOUNTER — RX RENEWAL (OUTPATIENT)
Age: 65
End: 2025-07-08

## 2025-09-17 ENCOUNTER — APPOINTMENT (OUTPATIENT)
Dept: CARDIOLOGY | Facility: CLINIC | Age: 65
End: 2025-09-17
Payer: MEDICARE

## 2025-09-17 VITALS
SYSTOLIC BLOOD PRESSURE: 114 MMHG | RESPIRATION RATE: 16 BRPM | WEIGHT: 202 LBS | HEIGHT: 64 IN | BODY MASS INDEX: 34.49 KG/M2 | TEMPERATURE: 97.6 F | DIASTOLIC BLOOD PRESSURE: 72 MMHG | HEART RATE: 81 BPM | OXYGEN SATURATION: 97 %

## 2025-09-17 DIAGNOSIS — E78.00 PURE HYPERCHOLESTEROLEMIA, UNSPECIFIED: ICD-10-CM

## 2025-09-17 DIAGNOSIS — E11.9 TYPE 2 DIABETES MELLITUS W/OUT COMPLICATIONS: ICD-10-CM

## 2025-09-17 DIAGNOSIS — I10 ESSENTIAL (PRIMARY) HYPERTENSION: ICD-10-CM

## 2025-09-17 DIAGNOSIS — E78.1 PURE HYPERGLYCERIDEMIA: ICD-10-CM

## 2025-09-17 PROCEDURE — 99214 OFFICE O/P EST MOD 30 MIN: CPT

## 2025-09-17 PROCEDURE — 93000 ELECTROCARDIOGRAM COMPLETE: CPT

## 2025-09-17 PROCEDURE — G2211 COMPLEX E/M VISIT ADD ON: CPT

## 2025-09-17 RX ORDER — PHENTERMINE AND TOPIRAMATE 3.75; 23 MG/1; MG/1
3.75-23 CAPSULE, EXTENDED RELEASE ORAL
Refills: 0 | Status: ACTIVE | COMMUNITY
Start: 2025-09-17